# Patient Record
Sex: FEMALE | Race: BLACK OR AFRICAN AMERICAN | NOT HISPANIC OR LATINO | Employment: FULL TIME | URBAN - METROPOLITAN AREA
[De-identification: names, ages, dates, MRNs, and addresses within clinical notes are randomized per-mention and may not be internally consistent; named-entity substitution may affect disease eponyms.]

---

## 2019-02-12 ENCOUNTER — PATIENT OUTREACH (OUTPATIENT)
Dept: ADMINISTRATIVE | Facility: HOSPITAL | Age: 54
End: 2019-02-12

## 2019-02-26 ENCOUNTER — PATIENT MESSAGE (OUTPATIENT)
Dept: INTERNAL MEDICINE | Facility: CLINIC | Age: 54
End: 2019-02-26

## 2019-02-26 ENCOUNTER — OFFICE VISIT (OUTPATIENT)
Dept: INTERNAL MEDICINE | Facility: CLINIC | Age: 54
End: 2019-02-26
Payer: COMMERCIAL

## 2019-02-26 ENCOUNTER — LAB VISIT (OUTPATIENT)
Dept: LAB | Facility: HOSPITAL | Age: 54
End: 2019-02-26
Attending: INTERNAL MEDICINE
Payer: COMMERCIAL

## 2019-02-26 VITALS
WEIGHT: 175.5 LBS | OXYGEN SATURATION: 98 % | BODY MASS INDEX: 31.1 KG/M2 | TEMPERATURE: 97 F | DIASTOLIC BLOOD PRESSURE: 80 MMHG | HEIGHT: 63 IN | HEART RATE: 78 BPM | SYSTOLIC BLOOD PRESSURE: 120 MMHG

## 2019-02-26 DIAGNOSIS — Z12.11 COLON CANCER SCREENING: ICD-10-CM

## 2019-02-26 DIAGNOSIS — E66.9 OBESITY (BMI 30.0-34.9): ICD-10-CM

## 2019-02-26 DIAGNOSIS — Z00.00 ANNUAL PHYSICAL EXAM: ICD-10-CM

## 2019-02-26 DIAGNOSIS — Z23 IMMUNIZATION DUE: ICD-10-CM

## 2019-02-26 DIAGNOSIS — Z00.00 ANNUAL PHYSICAL EXAM: Primary | ICD-10-CM

## 2019-02-26 PROBLEM — E66.811 OBESITY (BMI 30.0-34.9): Status: ACTIVE | Noted: 2019-02-26

## 2019-02-26 LAB
ALBUMIN SERPL BCP-MCNC: 3.8 G/DL
ALP SERPL-CCNC: 80 U/L
ALT SERPL W/O P-5'-P-CCNC: 17 U/L
ANION GAP SERPL CALC-SCNC: 7 MMOL/L
AST SERPL-CCNC: 21 U/L
BASOPHILS # BLD AUTO: 0.09 K/UL
BASOPHILS NFR BLD: 1.2 %
BILIRUB SERPL-MCNC: 0.4 MG/DL
BUN SERPL-MCNC: 10 MG/DL
CALCIUM SERPL-MCNC: 9.9 MG/DL
CHLORIDE SERPL-SCNC: 105 MMOL/L
CHOLEST SERPL-MCNC: 284 MG/DL
CHOLEST/HDLC SERPL: 4.4 {RATIO}
CO2 SERPL-SCNC: 29 MMOL/L
CREAT SERPL-MCNC: 1 MG/DL
DIFFERENTIAL METHOD: NORMAL
EOSINOPHIL # BLD AUTO: 0.2 K/UL
EOSINOPHIL NFR BLD: 2.8 %
ERYTHROCYTE [DISTWIDTH] IN BLOOD BY AUTOMATED COUNT: 13 %
EST. GFR  (AFRICAN AMERICAN): >60 ML/MIN/1.73 M^2
EST. GFR  (NON AFRICAN AMERICAN): >60 ML/MIN/1.73 M^2
GLUCOSE SERPL-MCNC: 89 MG/DL
HCT VFR BLD AUTO: 40.5 %
HDLC SERPL-MCNC: 64 MG/DL
HDLC SERPL: 22.5 %
HGB BLD-MCNC: 13.1 G/DL
IMM GRANULOCYTES # BLD AUTO: 0.02 K/UL
IMM GRANULOCYTES NFR BLD AUTO: 0.3 %
LDLC SERPL CALC-MCNC: 189.8 MG/DL
LYMPHOCYTES # BLD AUTO: 2.8 K/UL
LYMPHOCYTES NFR BLD: 38.2 %
MCH RBC QN AUTO: 29.4 PG
MCHC RBC AUTO-ENTMCNC: 32.3 G/DL
MCV RBC AUTO: 91 FL
MONOCYTES # BLD AUTO: 0.8 K/UL
MONOCYTES NFR BLD: 10.1 %
NEUTROPHILS # BLD AUTO: 3.5 K/UL
NEUTROPHILS NFR BLD: 47.4 %
NONHDLC SERPL-MCNC: 220 MG/DL
NRBC BLD-RTO: 0 /100 WBC
PLATELET # BLD AUTO: 267 K/UL
PMV BLD AUTO: 10.4 FL
POTASSIUM SERPL-SCNC: 4.4 MMOL/L
PROT SERPL-MCNC: 7.3 G/DL
RBC # BLD AUTO: 4.46 M/UL
SODIUM SERPL-SCNC: 141 MMOL/L
TRIGL SERPL-MCNC: 151 MG/DL
TSH SERPL DL<=0.005 MIU/L-ACNC: 2.4 UIU/ML
WBC # BLD AUTO: 7.39 K/UL

## 2019-02-26 PROCEDURE — 90471 TDAP VACCINE GREATER THAN OR EQUAL TO 7YO IM: ICD-10-PCS | Mod: S$GLB,,, | Performed by: INTERNAL MEDICINE

## 2019-02-26 PROCEDURE — 99999 PR PBB SHADOW E&M-EST. PATIENT-LVL III: ICD-10-PCS | Mod: PBBFAC,,, | Performed by: INTERNAL MEDICINE

## 2019-02-26 PROCEDURE — 85025 COMPLETE CBC W/AUTO DIFF WBC: CPT

## 2019-02-26 PROCEDURE — 36415 COLL VENOUS BLD VENIPUNCTURE: CPT

## 2019-02-26 PROCEDURE — 90715 TDAP VACCINE GREATER THAN OR EQUAL TO 7YO IM: ICD-10-PCS | Mod: S$GLB,,, | Performed by: INTERNAL MEDICINE

## 2019-02-26 PROCEDURE — 90471 IMMUNIZATION ADMIN: CPT | Mod: S$GLB,,, | Performed by: INTERNAL MEDICINE

## 2019-02-26 PROCEDURE — 80053 COMPREHEN METABOLIC PANEL: CPT

## 2019-02-26 PROCEDURE — 90715 TDAP VACCINE 7 YRS/> IM: CPT | Mod: S$GLB,,, | Performed by: INTERNAL MEDICINE

## 2019-02-26 PROCEDURE — 99386 PREV VISIT NEW AGE 40-64: CPT | Mod: 25,S$GLB,, | Performed by: INTERNAL MEDICINE

## 2019-02-26 PROCEDURE — 99386 PR PREVENTIVE VISIT,NEW,40-64: ICD-10-PCS | Mod: 25,S$GLB,, | Performed by: INTERNAL MEDICINE

## 2019-02-26 PROCEDURE — 99999 PR PBB SHADOW E&M-EST. PATIENT-LVL III: CPT | Mod: PBBFAC,,, | Performed by: INTERNAL MEDICINE

## 2019-02-26 PROCEDURE — 80061 LIPID PANEL: CPT

## 2019-02-26 PROCEDURE — 84443 ASSAY THYROID STIM HORMONE: CPT

## 2019-02-26 RX ORDER — ESTERIFIED ESTROGEN AND METHYLTESTOSTERONE .625; 1.25 MG/1; MG/1
1 TABLET ORAL DAILY
COMMUNITY
End: 2019-02-26

## 2019-02-26 RX ORDER — SODIUM, POTASSIUM,MAG SULFATES 17.5-3.13G
SOLUTION, RECONSTITUTED, ORAL ORAL
Qty: 354 ML | Refills: 0 | Status: ON HOLD | OUTPATIENT
Start: 2019-02-26 | End: 2019-04-16 | Stop reason: HOSPADM

## 2019-02-26 RX ORDER — AMOXICILLIN 500 MG
CAPSULE ORAL DAILY
COMMUNITY

## 2019-02-26 NOTE — PROGRESS NOTES
Subjective:       Patient ID: Yessenia Glover is a 54 y.o. female.    Chief Complaint: Establish Care and Annual Exam    Yessenia Glover  54 y.o. Black or  female     Patient presents with:  Establish Care  Annual Exam    HPI: Presents to the clinic to establish care and for an annual exam. She is new to [facility]. She is without significant complaints.   She is fasting today for labs.   She reports being on both high blood pressure and high cholesterol medication in the past. She has not taken any medications for these issues in the past year.   She admits to poor dieting and lack of regular physical activity.     Lipid Panel due on 1967  Pap Smear with HPV Cotest due on 02/11/1988  Mammogram due on 02/11/2007  Colonoscopy due on 02/11/2017  TETANUS VACCINE due on 03/04/2018  Influenza Vaccine Completed    Past Medical History:  Hyperlipidemia  Hypertension    Past Surgical History:  TUBAL LIGATION    Review of patient's family history indicates:  Problem: Hypertension      Relation: Mother          Age of Onset: (Not Specified)  Problem: Hypertension      Relation: Father          Age of Onset: (Not Specified)      Current Outpatient Medications on File Prior to Visit:  fish oil-omega-3 fatty acids 300-1,000 mg capsule, Take by mouth once daily., Disp: , Rfl:   MULTIVIT-IRON-MIN-FOLIC ACID 3,500-18-0.4 UNIT-MG-MG ORAL CHEW, Take by mouth once daily., Disp: , Rfl:     Allergies:  Review of patient's allergies indicates:   -- Codeine -- Itching   -- Sulfa (sulfonamide antibiotics) -- Nausea And Vomiting                Review of Systems   Constitutional: Positive for fatigue. Negative for fever and unexpected weight change.   HENT: Positive for dental problem (loose bridge work on the left). Negative for trouble swallowing and voice change.    Eyes: Positive for visual disturbance (wears reading glasses).   Respiratory: Negative for cough and shortness of breath.    Cardiovascular: Negative for  chest pain, palpitations and leg swelling.   Gastrointestinal: Negative for abdominal pain, blood in stool, constipation, diarrhea and nausea.   Endocrine: Positive for heat intolerance.   Genitourinary: Positive for menstrual problem. Negative for dysuria and frequency.        Menopausal symptoms.    Musculoskeletal: Negative for arthralgias, back pain, gait problem and joint swelling.   Neurological: Negative for dizziness and headaches.   Psychiatric/Behavioral: Positive for sleep disturbance. Negative for dysphoric mood. The patient is not nervous/anxious.        Objective:      Physical Exam   Constitutional: She is oriented to person, place, and time. She appears well-developed and well-nourished. No distress.   Eyes: No scleral icterus.   Neck: No tracheal deviation present. No thyromegaly present.   Cardiovascular: Normal rate, regular rhythm and normal heart sounds.   Pulmonary/Chest: Effort normal and breath sounds normal. No respiratory distress. She has no wheezes. She has no rales.   Abdominal: Soft. Bowel sounds are normal.   Musculoskeletal: She exhibits no edema.   Lymphadenopathy:     She has no cervical adenopathy.   Neurological: She is alert and oriented to person, place, and time.   Skin: Skin is warm and dry.   Darkened skin on the right anterior neck.    Psychiatric: She has a normal mood and affect.   Vitals reviewed.      Assessment:       1. Annual physical exam    2. Obesity (BMI 30.0-34.9)    3. Colon cancer screening    4. Immunization due        Plan:       Yessenia was seen today for establish care and annual exam.    Diagnoses and all orders for this visit:    Annual physical exam  -     Counseled regarding age appropriate screenings and immunizations  -     Counseled regarding lifestyle modifications  -     Comprehensive metabolic panel; Future  -     TSH; Future  -     CBC auto differential; Future  -     Lipid panel; Future    Obesity (BMI 30.0-34.9)  -     Lifestyle modifications  discussed    Colon cancer screening  -     Case request GI: COLONOSCOPY  -     sodium,potassium,mag sulfates (SUPREP BOWEL PREP KIT) 17.5-3.13-1.6 gram SolR; Take as directed.    Immunization due  -     (In Office Administered) Tdap Vaccine    Labs today.     Obtain Pap smear and mammogram reports.     RTC annually and as needed.

## 2019-02-27 ENCOUNTER — PATIENT MESSAGE (OUTPATIENT)
Dept: INTERNAL MEDICINE | Facility: CLINIC | Age: 54
End: 2019-02-27

## 2019-02-28 ENCOUNTER — TELEPHONE (OUTPATIENT)
Dept: ENDOSCOPY | Facility: HOSPITAL | Age: 54
End: 2019-02-28

## 2019-02-28 NOTE — TELEPHONE ENCOUNTER
Endoscopy Scheduling Questionnaire:    Call Type:Incoming call    1. Have you been admitted overnight to the hospital in the past 3 months? no  2. Do you get CP and SOB while walking up a flight of stairs? no  3. Have you had a stent placed in the past 12 months? no  4. Have you had a stroke or heart attack in the past 6 months? no  5. Have you had any chest pain in the past 3 months? no      If so, have you been evaluated by your PCP or Cardiologist? no  6. Do you take weight loss medications? no  7. Have you been diagnosed with Diverticulitis within the past 3 months? no  8. Are you having any GI symptoms that you feel need to be evaluated prior to your procedure? no  9. Are you on dialysis? no  10. Are you diabetic? no  11. Do you have any other health issues that you feel might limit your ability to safely have the procedure and/or sedation? no  12. Is the patient over 79 yo? no        If so, has the patient been seen by their PCP or GI in the last 3 months? N/A       -I have reviewed the last colonoscopy for recommendations regarding surveillance and bowel prep  is NA  -I have reviewed the patient's medications and allergies. She is not on high risk medications and will require cardiac clearance. A clearance request NA  -I have verified the pharmacy information. The prep being used is Suprep. The patient's prep instructions were sent via mail..    Date Endoscopy Scheduled: Date: 4/30/19  Or  Date Gastro office visit Scheduled: NA

## 2019-03-01 ENCOUNTER — TELEPHONE (OUTPATIENT)
Dept: INTERNAL MEDICINE | Facility: CLINIC | Age: 54
End: 2019-03-01

## 2019-03-01 DIAGNOSIS — E78.5 HYPERLIPIDEMIA LDL GOAL <160: Primary | ICD-10-CM

## 2019-03-04 ENCOUNTER — TELEPHONE (OUTPATIENT)
Dept: ENDOSCOPY | Facility: HOSPITAL | Age: 54
End: 2019-03-04

## 2019-03-04 NOTE — TELEPHONE ENCOUNTER
Returning Voice message. Left patient a message to call our office back, call back number provided.

## 2019-03-12 ENCOUNTER — TELEPHONE (OUTPATIENT)
Dept: ENDOSCOPY | Facility: HOSPITAL | Age: 54
End: 2019-03-12

## 2019-04-04 ENCOUNTER — OFFICE VISIT (OUTPATIENT)
Dept: DERMATOLOGY | Facility: CLINIC | Age: 54
End: 2019-04-04
Payer: COMMERCIAL

## 2019-04-04 DIAGNOSIS — L91.8 ACROCHORDON: ICD-10-CM

## 2019-04-04 DIAGNOSIS — L30.4 INTERTRIGO: Primary | ICD-10-CM

## 2019-04-04 PROCEDURE — 99203 PR OFFICE/OUTPT VISIT, NEW, LEVL III, 30-44 MIN: ICD-10-PCS | Mod: 25,S$GLB,, | Performed by: DERMATOLOGY

## 2019-04-04 PROCEDURE — 11200 RMVL SKIN TAGS UP TO&INC 15: CPT | Mod: S$GLB,,, | Performed by: DERMATOLOGY

## 2019-04-04 PROCEDURE — 99999 PR PBB SHADOW E&M-EST. PATIENT-LVL II: CPT | Mod: PBBFAC,,, | Performed by: DERMATOLOGY

## 2019-04-04 PROCEDURE — 99203 OFFICE O/P NEW LOW 30 MIN: CPT | Mod: 25,S$GLB,, | Performed by: DERMATOLOGY

## 2019-04-04 PROCEDURE — 99999 PR PBB SHADOW E&M-EST. PATIENT-LVL II: ICD-10-PCS | Mod: PBBFAC,,, | Performed by: DERMATOLOGY

## 2019-04-04 PROCEDURE — 11200 PR REMOVAL OF SKIN TAGS, UP TO 15: ICD-10-PCS | Mod: S$GLB,,, | Performed by: DERMATOLOGY

## 2019-04-04 RX ORDER — ROSUVASTATIN CALCIUM 5 MG/1
TABLET, COATED ORAL
COMMUNITY
Start: 2019-01-10 | End: 2019-04-04

## 2019-04-04 RX ORDER — MICONAZOLE NITRATE 2 %
POWDER (GRAM) TOPICAL
Qty: 85 G | Refills: 11 | Status: SHIPPED | OUTPATIENT
Start: 2019-04-04 | End: 2020-09-10

## 2019-04-04 RX ORDER — KETOCONAZOLE 20 MG/G
CREAM TOPICAL
Qty: 60 G | Refills: 3 | Status: SHIPPED | OUTPATIENT
Start: 2019-04-04 | End: 2020-09-10

## 2019-04-04 RX ORDER — TRIAMCINOLONE ACETONIDE 0.25 MG/G
CREAM TOPICAL 2 TIMES DAILY PRN
Qty: 80 G | Refills: 1 | Status: SHIPPED | OUTPATIENT
Start: 2019-04-04 | End: 2020-09-10

## 2019-04-04 NOTE — PATIENT INSTRUCTIONS
Dove sensitive skin deodorant or [employer]m's all natural deodorant      Instructions for intertrigo:    · Use ketoconazole cream twice a day.  For flares only, mix ketoconazole cream with triamcinolone 0.025% cream (mild steroid cream) and use both twice a day when skin rash worsens and flares.    · Use miconazole powder or purchase over-the-counter Zeasorb-AF powder and use two to three times a day as needed to absorb sweat and prevent rash from worsening  · Frequently air out folds while at home (use a hand-held blow dryer set to cool)

## 2019-04-04 NOTE — PROGRESS NOTES
Subjective:       Patient ID:  Yessenia Glover is a 54 y.o. female who presents for   Chief Complaint   Patient presents with    Mole     moles beneath breast x 2 years      History of Present Illness: The patient presents with chief complaint of moles.  Location: beneath breast  Duration: 2 years   Signs/Symptoms: irritation     Prior treatments: none    Pt denies personal and family hx of skin cancer.         Review of Systems   Constitutional: Negative for fever and chills.   Gastrointestinal: Negative for nausea and vomiting.   Skin: Negative for daily sunscreen use, activity-related sunscreen use and recent sunburn.   Hematologic/Lymphatic: Does not bruise/bleed easily.        Objective:    Physical Exam   Constitutional: She appears well-developed and well-nourished. No distress.   Neurological: She is alert and oriented to person, place, and time. She is not disoriented.   Psychiatric: She has a normal mood and affect.   Skin:   Areas Examined (abnormalities noted in diagram):   Head / Face Inspection Performed  Neck Inspection Performed  Chest / Axilla Inspection Performed  Abdomen Inspection Performed  Back Inspection Performed  RUE Inspected  LUE Inspection Performed  Nails and Digits Inspection Performed              Diagram Legend     Erythematous scaling macule/papule c/w actinic keratosis       Vascular papule c/w angioma      Pigmented verrucoid papule/plaque c/w seborrheic keratosis      Yellow umbilicated papule c/w sebaceous hyperplasia      Irregularly shaped tan macule c/w lentigo     1-2 mm smooth white papules consistent with Milia      Movable subcutaneous cyst with punctum c/w epidermal inclusion cyst      Subcutaneous movable cyst c/w pilar cyst      Firm pink to brown papule c/w dermatofibroma      Pedunculated fleshy papule(s) c/w skin tag(s)      Evenly pigmented macule c/w junctional nevus     Mildly variegated pigmented, slightly irregular-bordered macule c/w mildly atypical nevus       Flesh colored to evenly pigmented papule c/w intradermal nevus       Pink pearly papule/plaque c/w basal cell carcinoma      Erythematous hyperkeratotic cursted plaque c/w SCC      Surgical scar with no sign of skin cancer recurrence      Open and closed comedones      Inflammatory papules and pustules      Verrucoid papule consistent consistent with wart     Erythematous eczematous patches and plaques     Dystrophic onycholytic nail with subungual debris c/w onychomycosis     Umbilicated papule    Erythematous-base heme-crusted tan verrucoid plaque consistent with inflamed seborrheic keratosis     Erythematous Silvery Scaling Plaque c/w Psoriasis     See annotation      Assessment / Plan:        Intertrigo  -     ketoconazole (NIZORAL) 2 % cream; AAA bid  Dispense: 60 g; Refill: 3  -     triamcinolone acetonide 0.025% (KENALOG) 0.025 % cream; Apply topically 2 (two) times daily as needed. Mild steroid.  Dispense: 80 g; Refill: 1  -     miconazole NITRATE 2 % (ZEASORB AF) 2 % top powder; Use two to three times daily to prevent rash  Dispense: 85 g; Refill: 11  -      Currently improved, will start above meds prn.    Acrochordon  Reassurance provided.  Informed patient that lesions are benign.  After risks, benefits and alternatives explained, including allergic reaction to anesthesia (if given), pain, recurrence, and scar, and verbal consent was obtained, 2 lesions treated with scissor excision.  Patient tolerated well.  Hemostasis achieved with aluminum chloride.  Verbal wound care instructions were given.              Follow up if symptoms worsen or fail to improve.

## 2019-04-05 ENCOUNTER — OFFICE VISIT (OUTPATIENT)
Dept: PODIATRY | Facility: CLINIC | Age: 54
End: 2019-04-05
Payer: COMMERCIAL

## 2019-04-05 VITALS
BODY MASS INDEX: 31.21 KG/M2 | WEIGHT: 176.13 LBS | HEIGHT: 63 IN | HEART RATE: 64 BPM | SYSTOLIC BLOOD PRESSURE: 126 MMHG | DIASTOLIC BLOOD PRESSURE: 83 MMHG

## 2019-04-05 DIAGNOSIS — B35.3 TINEA PEDIS OF BOTH FEET: ICD-10-CM

## 2019-04-05 DIAGNOSIS — B35.1 DERMATOPHYTOSIS OF NAIL: Primary | ICD-10-CM

## 2019-04-05 PROCEDURE — 87107 FUNGI IDENTIFICATION MOLD: CPT

## 2019-04-05 PROCEDURE — 99203 PR OFFICE/OUTPT VISIT, NEW, LEVL III, 30-44 MIN: ICD-10-PCS | Mod: S$GLB,,, | Performed by: PODIATRIST

## 2019-04-05 PROCEDURE — 87101 SKIN FUNGI CULTURE: CPT

## 2019-04-05 PROCEDURE — 99999 PR PBB SHADOW E&M-EST. PATIENT-LVL III: ICD-10-PCS | Mod: PBBFAC,,, | Performed by: PODIATRIST

## 2019-04-05 PROCEDURE — 99999 PR PBB SHADOW E&M-EST. PATIENT-LVL III: CPT | Mod: PBBFAC,,, | Performed by: PODIATRIST

## 2019-04-05 PROCEDURE — 99203 OFFICE O/P NEW LOW 30 MIN: CPT | Mod: S$GLB,,, | Performed by: PODIATRIST

## 2019-04-05 RX ORDER — CICLOPIROX 80 MG/ML
SOLUTION TOPICAL
Qty: 1 BOTTLE | Refills: 10 | Status: SHIPPED | OUTPATIENT
Start: 2019-04-05 | End: 2022-03-29

## 2019-04-05 NOTE — PATIENT INSTRUCTIONS
PENLAC ANTI-FUNGAL TOPICAL INSTRUCTIONS   1. You have been prescribed Penlac nail lacquer (Ciclopirox) topical solution 8%. Go and  the prescription from your local pharmacy.    2. Remove any nail polish on your feet. File away (with emery board) loose nail material and trim nails.    3. Apply the Penlac nail lacquer (ciclopirox) Topical Solution, 8% once daily (preferably at bedtime or eight hours before washing) to all affected nails with the applicator brush provided. The nail lacquer should be applied evenly over the entire nail plate. If possible, apply the solution to the nail bed, hyponychium, and the under surface of the nail plate when if your nail is loosely attached. Remember fungus lives under the nail plate, therefore the more the solution penetrates the nail bed, the better.    3. Continue to apply the solution daily (at bedtime preferably).  Daily applications should be made over the previous coat and removed with alcohol every seven days. This cycle should be repeated throughout the duration of therapy.     4. On the 7th day, remove the solution from all of your nails with alcohol. File your nails again with an emery board and then repeat the cycle again.    5. This treatment must be consistent. If you skip a day, continue the cycle as recommended. It may take up to 1 year for your nails to clear the fungal infection. Be patient.    It is recommended to dispose of all infected shoe gear that you will not likely wear again as well as weekly cleansing of all showering/bathing areas with antiseptics.Fungal spores like to hide in dark, warm, moist environments. Lastly, monthly treatments of all current shoe gear with moth balls x 8 hours.                  FUNGAL NAIL INFECTIONS    If your nail is thick and looks white or yellow, it may be infected with fungus. Nail   infections don't look pleasant, but they are not serious. There are treatments that   can clear up the infection.     What is a  fungal nail infection?   It's easy to catch a fungal nail infection, and lots of people get them. The sooner you   treat an infection, the easier it is to get rid of it. The fungi that cause these infections often live in warm, damp places, such as showers and floors around changing rooms.   People used to think there was nothing you could do about a fungal nail infection. But   there are now good treatments that can get rid of it. However, they take a long time to   work (as long as one year if the infection is bad). So you need to be patient.    Fungal infection of the nails causes changes in the appearance of fingernails and toenails. They may thicken, discolor, change shape or split. This condition is difficult to treat because nails grow slowly and have limited blood supply. It is common to have the infection come back after treatment.       What are the symptoms?   Your nail may look whitish or yellowish, and raised up from the finger or toe. The skin   around your nail may turn red. Sometimes the nail lifts off altogether. If the infection is   severe, the nail may also be crumbly. It's more common to get an infection of a toenail   than a fingernail. If you've had an infection for a long time, it may be painful. If you have a badly infected toe, it may be difficult to walk. If your immune system is weak or you have diabetes, you may be more likely to get these infections. The infection can also be more serious. So it's important to see your doctor as soon as possible if you think you have a nail infection.     What treatments work?   To get rid of a fungal nail infection you will probably have to take tablets, sometimes for   several months. There are also topical solutions (over the counter and prescription) that  you can put on your nail, and  things you can do to try to avoid getting another nail infection.    There are two types of medicines used.   Topical anti-fungal medicines are applied to the  "surface of the skin and nail area. These medicines are not very effective because they cannot get deep into the nail.     Topical medicines are most useful in combination with oral medicines . Oral antifungal medicines are more effective because they penetrate the nail from the inside out.  If medicines fail, the nail can be removed surgically or chemically. This improves the effectiveness of medical treatment because the fungus is physically removed from the body.       Tablets   The tablets that doctors use most often are called itraconazole (brand name Sporanox)   and terbinafine (Lamisil). Terbinafine seems to work slightly better. If you take terbinafine every day for 12 weeks, there's a 6 in 10 chance you'll get rid of   your fungal toenail infection.      How are fungal nail infections treated? -- Treatment depends, in part, on how severe the infection is, and how much it bothers you. If your infection is mild or doesn't bother you very much, you might choose not to treat it. An untreated nail infection probably won't go away, but it probably won't cause any long-term problems either.  When people need or choose to have treatment, it usually involves "antifungal" medicines that you get with a prescription from your doctor. These medicines are taken by mouth or put on the nail.Treatment with pills usually lasts a few months. Some people who take these medicines need to have blood tests. That's because these medicines can affect the liver.  If you don't want to or can't take antifungal pills, your doctor will talk with you about other treatment options. These might include using an antifungal medicine on the nail or having surgery to remove your nail.    Before starting any of these treatments, you should know that:  ?It can take many months for your nail to look normal again.  ?There is a chance that the treatment won't work. The infection might not get better, or it might come back. If either of these things " happen, your doctor can try another treatment or send you to a specialist.  Can fungal nail infections be prevented? -- Sometimes. To reduce your chance of getting one, you can:  ?Keep your feet clean and dry.  ?Avoid sharing nail tools, such as clippers and scissors.  ?Wear flip-flops or other footwear in a gym shower or locker room.  What if I want to get pregnant? -- If you want to get pregnant, let your doctor or nurse know. He or she might recommend that you not take certain antifungal medicines during pregnancy.    Alternative final options are:   If still no resolution with oral tablets or topics: then we can completely avulse/remove all involved toenails with subsequent treatment with topical antifungal solution.       Home Care:   1) Use medicines exactly as directed for as long as directed. Treating a fungal infection can take longer than other kinds of infections.   2) Smoking is a risk factor for fungal infection. This is one more reason to quit.   3) Wear absorbent socks and shoes that have ventilation. Sweaty feet increases risk of fungal infection and make an existing infection harder to treat.   4) Use footwear when in damp public places like swimming pools, gyms and shower rooms. This helps avoid exposure to the fungus that grows there.   It is recommended to dispose of all infected shoe gear that you will not likely wear again as well as weekly cleansing of all showering/bathing areas with antiseptics.Fungal spores like to hide in dark, warm, moist environments. Lastly, monthly treatments of all current shoe gear with moth balls x 8 hours.       Follow Up   with your doctor as directed by our staff.   Get Prompt Medical Attention   if any of the following occur:   -- Skin alongside the nail becomes reddened, swollen, painful or drains pus   -- Side effects from oral anti-fungal medicines       © 2283-2333 [employer]. [address] [place] [address], [place] [address]. All rights reserved.  This information is not intended as a substitute for professional medical care. Always follow your healthcare professional's instructions.             You can purchase UREA 40% cream online     www.[employer]     [employer]   Urea 40% Foot Cream 4 oz - Best Callus Remover - Moisturizes & Rehydrates Thick, Cracked, Rough, Dead & Dry Skin - For Feet, Elbows and Hands + Free Pumice Stone - 100% Money Back Guarantee   4.6 out of 5 stars 1,181   $14.99 Prime    You can also purchase Flexitol heel balm cream. This can be found at [employer] or online www.[employer]

## 2019-04-16 ENCOUNTER — HOSPITAL ENCOUNTER (OUTPATIENT)
Facility: HOSPITAL | Age: 54
Discharge: HOME | End: 2019-04-16
Attending: FAMILY MEDICINE | Admitting: FAMILY MEDICINE
Payer: COMMERCIAL

## 2019-04-16 ENCOUNTER — ANESTHESIA EVENT (OUTPATIENT)
Dept: ENDOSCOPY | Facility: HOSPITAL | Age: 54
End: 2019-04-16
Payer: COMMERCIAL

## 2019-04-16 ENCOUNTER — ANESTHESIA (OUTPATIENT)
Dept: ENDOSCOPY | Facility: HOSPITAL | Age: 54
End: 2019-04-16
Payer: COMMERCIAL

## 2019-04-16 DIAGNOSIS — Z12.11 COLON CANCER SCREENING: Primary | ICD-10-CM

## 2019-04-16 DIAGNOSIS — Z12.11 SPECIAL SCREENING FOR MALIGNANT NEOPLASMS, COLON: ICD-10-CM

## 2019-04-16 LAB
B-HCG UR QL: NEGATIVE
CTP QC/QA: YES

## 2019-04-16 PROCEDURE — 81025 URINE PREGNANCY TEST: CPT | Performed by: FAMILY MEDICINE

## 2019-04-16 PROCEDURE — G0121 COLON CA SCRN NOT HI RSK IND: HCPCS | Mod: ,,, | Performed by: FAMILY MEDICINE

## 2019-04-16 PROCEDURE — G0121 COLON CA SCRN NOT HI RSK IND: HCPCS | Performed by: FAMILY MEDICINE

## 2019-04-16 PROCEDURE — 37000008 HC ANESTHESIA 1ST 15 MINUTES: Performed by: FAMILY MEDICINE

## 2019-04-16 PROCEDURE — 25000003 PHARM REV CODE 250: Performed by: NURSE ANESTHETIST, CERTIFIED REGISTERED

## 2019-04-16 PROCEDURE — G0121 COLON CA SCRN NOT HI RSK IND: ICD-10-PCS | Mod: ,,, | Performed by: FAMILY MEDICINE

## 2019-04-16 PROCEDURE — 25000003 PHARM REV CODE 250: Performed by: FAMILY MEDICINE

## 2019-04-16 PROCEDURE — 37000009 HC ANESTHESIA EA ADD 15 MINS: Performed by: FAMILY MEDICINE

## 2019-04-16 PROCEDURE — 63600175 PHARM REV CODE 636 W HCPCS: Performed by: NURSE ANESTHETIST, CERTIFIED REGISTERED

## 2019-04-16 RX ORDER — SODIUM CHLORIDE 0.9 % (FLUSH) 0.9 %
10 SYRINGE (ML) INJECTION
Status: CANCELLED | OUTPATIENT
Start: 2019-04-16

## 2019-04-16 RX ORDER — LIDOCAINE HYDROCHLORIDE 10 MG/ML
INJECTION INFILTRATION; PERINEURAL
Status: DISCONTINUED | OUTPATIENT
Start: 2019-04-16 | End: 2019-04-16

## 2019-04-16 RX ORDER — PROPOFOL 10 MG/ML
VIAL (ML) INTRAVENOUS
Status: DISCONTINUED | OUTPATIENT
Start: 2019-04-16 | End: 2019-04-16

## 2019-04-16 RX ORDER — SODIUM CHLORIDE, SODIUM LACTATE, POTASSIUM CHLORIDE, CALCIUM CHLORIDE 600; 310; 30; 20 MG/100ML; MG/100ML; MG/100ML; MG/100ML
INJECTION, SOLUTION INTRAVENOUS CONTINUOUS
Status: DISCONTINUED | OUTPATIENT
Start: 2019-04-16 | End: 2019-04-16 | Stop reason: HOSPADM

## 2019-04-16 RX ADMIN — PROPOFOL 100 MG: 10 INJECTION, EMULSION INTRAVENOUS at 09:04

## 2019-04-16 RX ADMIN — PROPOFOL 50 MG: 10 INJECTION, EMULSION INTRAVENOUS at 09:04

## 2019-04-16 RX ADMIN — SODIUM CHLORIDE, SODIUM LACTATE, POTASSIUM CHLORIDE, AND CALCIUM CHLORIDE: .6; .31; .03; .02 INJECTION, SOLUTION INTRAVENOUS at 08:04

## 2019-04-16 RX ADMIN — LIDOCAINE HYDROCHLORIDE 50 MG: 10 INJECTION, SOLUTION INFILTRATION; PERINEURAL at 09:04

## 2019-04-16 NOTE — PLAN OF CARE
Pt. Adequately sedated. V/S stable. Timeout performed and everyone agreed. See anesthesia notes.

## 2019-04-16 NOTE — DISCHARGE SUMMARY
Endoscopy Discharge Summary      Admit Date: 4/16/2019    Discharge Date and Time:  4/16/2019 9:42 AM    Attending Physician: Grant Cristobal MD     Discharge Physician: Grant Cristobal MD     Principal Admitting Diagnoses: Colon cancer screening         Discharge Diagnosis: The primary encounter diagnosis was Colon cancer screening. A diagnosis of Special screening for malignant neoplasms, colon was also pertinent to this visit.     Discharged Condition: Good    Indication for Admission: Colon cancer screening     Hospital Course: Patient was admitted for an inpatient procedure and tolerated the procedure well with no complications.    Significant Diagnostic Studies: Colonoscopy    Pathology (if any):  Specimen (12h ago, onward)    None          Estimated Blood Loss: 0 ml.    Discussed with: patient and family.    Disposition: Home.    Follow Up/Patient Instructions:   Current Discharge Medication List      CONTINUE these medications which have NOT CHANGED    Details   Ca-D3-mag-zinc--leti-boron (CALTRATE 600-D PLUS MINERALS) 600 mg calcium- 800 unit-40 mg Chew       ciclopirox (PENLAC) 8 % Soln Apply to affected toenails at night time DAILY. On 7th day, file nails down, clean all nails with alcohol and restart application process.  Qty: 1 Bottle, Refills: 10      fish oil-omega-3 fatty acids 300-1,000 mg capsule Take by mouth once daily.      ketoconazole (NIZORAL) 2 % cream AAA bid  Qty: 60 g, Refills: 3    Associated Diagnoses: Intertrigo      miconazole NITRATE 2 % (ZEASORB AF) 2 % top powder Use two to three times daily to prevent rash  Qty: 85 g, Refills: 11    Associated Diagnoses: Intertrigo      MULTIVIT-IRON-MIN-FOLIC ACID 3,500-18-0.4 UNIT-MG-MG ORAL CHEW Take by mouth once daily.      triamcinolone acetonide 0.025% (KENALOG) 0.025 % cream Apply topically 2 (two) times daily as needed. Mild steroid.  Qty: 80 g, Refills: 1    Associated Diagnoses: Intertrigo         STOP taking these medications        sodium,potassium,mag sulfates (SUPREP BOWEL PREP KIT) 17.5-3.13-1.6 gram SolR Comments:   Reason for Stopping:               Discharge Procedure Orders   Diet general     Call MD for:  temperature >100.4     Call MD for:  persistent nausea and vomiting     Call MD for:  severe uncontrolled pain     Call MD for:  difficulty breathing, headache or visual disturbances     Call MD for:  redness, tenderness, or signs of infection (pain, swelling, redness, odor or green/yellow discharge around incision site)     Call MD for:  hives     Call MD for:  persistent dizziness or light-headedness     No dressing needed       Follow-up Information     Go to Kellee Suero DO.    Specialty:  Internal Medicine  Why:  As needed  Contact information:  [address] [place] [address] DR Vic HER [id number] [phone]

## 2019-04-16 NOTE — ANESTHESIA PREPROCEDURE EVALUATION
04/16/2019  Yessenia Glover is a 54 y.o., female.    Anesthesia Evaluation    I have reviewed the Patient Summary Reports.     I have reviewed the Medications.     Review of Systems  Anesthesia Hx:  No problems with previous Anesthesia    Cardiovascular:   Hypertension hyperlipidemia        Physical Exam  General:  Obesity    Airway/Jaw/Neck:  Airway Findings: Mouth Opening: Normal Tongue: Normal  General Airway Assessment: Adult  Mallampati: I  TM Distance: Normal, at least 6 cm      Dental:  Dental Findings: In tact   Chest/Lungs:  Chest/Lungs Findings: Normal Respiratory Rate     Heart/Vascular:  Heart Findings: Rate: Normal  Rhythm: Regular Rhythm             Anesthesia Plan  Type of Anesthesia, risks & benefits discussed:  Anesthesia Type:  MAC  Patient's Preference:   Intra-op Monitoring Plan: standard ASA monitors  Intra-op Monitoring Plan Comments:   Post Op Pain Control Plan:   Post Op Pain Control Plan Comments:   Induction:   IV  Beta Blocker:  Patient is not currently on a Beta-Blocker (No further documentation required).       Informed Consent: Patient understands risks and agrees with Anesthesia plan.  Questions answered. Anesthesia consent signed with patient.  ASA Score: 2     Day of Surgery Review of History & Physical:  There are no significant changes.          Ready For Surgery From Anesthesia Perspective.

## 2019-04-16 NOTE — ANESTHESIA POSTPROCEDURE EVALUATION
Anesthesia Post Evaluation    Patient: Yessenia Glover    Procedure(s) Performed: Procedure(s) (LRB):  COLONOSCOPY (N/A)    Final Anesthesia Type: MAC  Patient location during evaluation: GI PACU  Patient participation: Yes- Able to Participate  Level of consciousness: awake and alert  Post-procedure vital signs: reviewed and stable  Pain management: adequate  Airway patency: patent  PONV status at discharge: No PONV  Anesthetic complications: no      Cardiovascular status: blood pressure returned to baseline  Respiratory status: unassisted and spontaneous ventilation  Hydration status: euvolemic  Follow-up not needed.          Vitals Value Taken Time   /78 4/16/2019 10:00 AM   Temp 36.3 °C (97.3 °F) 4/16/2019  8:29 AM   Pulse 64 4/16/2019 10:00 AM   Resp 18 4/16/2019 10:00 AM   SpO2 100 % 4/16/2019 10:00 AM         Event Time     Out of Recovery 10:07:49          Pain/Ele Score: Ele Score: 10 (4/16/2019  9:40 AM)

## 2019-04-16 NOTE — H&P
Short Stay Endoscopy History and Physical    PCP - Kellee Suero, DO    Procedure - Colonoscopy  ASA - 2  Mallampati - per anesthesia  History of Anesthesia problems - no  Family history Anesthesia problems -  no     HPI:  This is a 54 y.o. female here for evaluation of :   Active Hospital Problems    Diagnosis  POA    *Colon cancer screening [Z12.11]  Not Applicable    Special screening for malignant neoplasms, colon [Z12.11]  Not Applicable      Resolved Hospital Problems   No resolved problems to display.         Health Maintenance       Date Due Completion Date    Colonoscopy 02/11/2017 ---    Mammogram 10/26/2020 10/26/2018    Pap Smear with HPV Cotest 10/31/2021 10/31/2018    Lipid Panel 02/26/2024 2/26/2019    TETANUS VACCINE 02/26/2029 2/26/2019          Screening - yes  History of polyps - no  Diarrhea - no  Anemia - no  Blood in stools - no  Abdominal pain - no  Other - no    ROS:  CONSTITUTIONAL: Denies weight change,  fatigue, fevers, chills, night sweats.  CARDIOVASCULAR: Denies chest pain, shortness of breath, orthopnea and edema.  RESPIRATORY: Denies cough, hemoptysis, dyspnea, and wheezing.  GI: See HPI.    Medical History:   Past Medical History:   Diagnosis Date    Hyperlipidemia     Hypertension        Surgical History:   Past Surgical History:   Procedure Laterality Date    AXILLARY SURGERY      Right    THYROIDECTOMY, PARTIAL      TUBAL LIGATION         Family History:   Family History   Problem Relation Age of Onset    Hypertension Mother     Hypertension Father        Social History:   Social History     Tobacco Use    Smoking status: Never Smoker    Smokeless tobacco: Never Used   Substance Use Topics    Alcohol use: No     Frequency: Never    Drug use: No       Allergies:   Review of patient's allergies indicates:   Allergen Reactions    Codeine Itching    Sulfa (sulfonamide antibiotics) Nausea And Vomiting       Medications:   No current facility-administered medications on  file prior to encounter.      Current Outpatient Medications on File Prior to Encounter   Medication Sig Dispense Refill    fish oil-omega-3 fatty acids 300-1,000 mg capsule Take by mouth once daily.      MULTIVIT-IRON-MIN-FOLIC ACID 3,500-18-0.4 UNIT-MG-MG ORAL CHEW Take by mouth once daily.      sodium,potassium,mag sulfates (SUPREP BOWEL PREP KIT) 17.5-3.13-1.6 gram SolR Take as directed. 354 mL 0       Physical Exam:  Vital Signs:   Vitals:    04/16/19 0829   BP: 111/73   Pulse: 70   Resp: 16   Temp: 97.3 °F (36.3 °C)     General Appearance: Well appearing in no acute distress  ENT: OP clear  Chest: CTA B  CV: RRR, no m/r/g  Abd: s/nt/nd/nabs  Ext: no edema    Labs:Reviewed    IMP:  Active Hospital Problems    Diagnosis  POA    *Colon cancer screening [Z12.11]  Not Applicable    Special screening for malignant neoplasms, colon [Z12.11]  Not Applicable      Resolved Hospital Problems   No resolved problems to display.         Plan:   I have explained the risks and benefits of colonoscopy to the patient including but not limited to bleeding, perforation, infection, and death. The patient wishes to proceed.

## 2019-04-16 NOTE — ANESTHESIA RELEASE NOTE
"Anesthesia Release from PACU Note    Patient: Yessenia Glover    Procedure(s) Performed: Procedure(s) (LRB):  COLONOSCOPY (N/A)    Anesthesia type: MAC    Post pain: Adequate analgesia    Post assessment: no apparent anesthetic complications, tolerated procedure well and no evidence of recall    Last Vitals:   Visit Vitals  /78   Pulse 64   Temp 36.3 °C (97.3 °F) (Tympanic)   Resp 18   Ht 5' 3" (1.6 m)   Wt 78.6 kg (173 lb 4.5 oz)   SpO2 100%   Breastfeeding? No   BMI 30.70 kg/m²       Post vital signs: stable    Level of consciousness: awake, alert  and oriented    Nausea/Vomiting: no nausea/no vomiting    Complications: none    Airway Patency: patent    Respiratory: unassisted, spontaneous ventilation, room air    Cardiovascular: stable and blood pressure at baseline    Hydration: euvolemic  "

## 2019-04-16 NOTE — TRANSFER OF CARE
"Anesthesia Transfer of Care Note    Patient: Yessenia Glover    Procedure(s) Performed: Procedure(s) (LRB):  COLONOSCOPY (N/A)    Patient location: GI    Anesthesia Type: MAC    Transport from OR: Transported from OR on room air with adequate spontaneous ventilation    Post pain: adequate analgesia    Post assessment: no apparent anesthetic complications    Post vital signs: stable    Level of consciousness: awake, alert and oriented    Nausea/Vomiting: no nausea/vomiting    Complications: none    Transfer of care protocol was followed      Last vitals:   Visit Vitals  /73 (BP Location: Left arm, Patient Position: Lying)   Pulse 70   Temp 36.3 °C (97.3 °F) (Tympanic)   Resp 16   Ht 5' 3" (1.6 m)   Wt 78.6 kg (173 lb 4.5 oz)   SpO2 100%   Breastfeeding? No   BMI 30.70 kg/m²     "

## 2019-04-16 NOTE — [employer] PATIENT INSTRUCTIONS
Discharge Summary/Instructions after an Endoscopic Procedure  Patient Name: Yessenia Glover  Patient MRN: [id number]  Patient YOB: 1965 Tuesday, April 16, 2019 Grant Cristobal MD  RESTRICTIONS:  During your procedure today, you received medications for sedation.  These   medications may affect your judgment, balance and coordination.  Therefore,   for 24 hours, you have the following restrictions:   - DO NOT drive a car, operate machinery, make legal/financial decisions,   sign important papers or drink alcohol.    ACTIVITY:  Today: no heavy lifting, straining or running due to procedural   sedation/anesthesia.  The following day: return to full activity including work.  DIET:  Eat and drink normally unless instructed otherwise.     TREATMENT FOR COMMON SIDE EFFECTS:  - Mild abdominal pain, nausea, belching, bloating or excessive gas:  rest,   eat lightly and use a heating pad.  - Sore Throat: treat with throat lozenges and/or gargle with warm salt   water.  - Because air was used during the procedure, expelling large amounts of air   from your rectum or belching is normal.  - If a bowel prep was taken, you may not have a bowel movement for 1-3 days.    This is normal.  SYMPTOMS TO WATCH FOR AND REPORT TO YOUR PHYSICIAN:  1. Abdominal pain or bloating, other than gas cramps.  2. Chest pain.  3. Back pain.  4. Signs of infection such as: chills or fever occurring within 24 hours   after the procedure.  5. Rectal bleeding, which would show as bright red, maroon, or black stools.   (A tablespoon of blood from the rectum is not serious, especially if   hemorrhoids are present.)  6. Vomiting.  7. Weakness or dizziness.  GO DIRECTLY TO THE NEAREST EMERGENCY ROOM IF YOU HAVE ANY OF THE FOLLOWING:      Difficulty breathing              Chills and/or fever over 101 F   Persistent vomiting and/or vomiting blood   Severe abdominal pain   Severe chest pain   Black, tarry stools   Bleeding- more than one  tablespoon   Any other symptom or condition that you feel may need urgent attention  Your doctor recommends these additional instructions:  If any biopsies were taken, your doctors clinic will contact you in 1 to 2   weeks with any results.  - Patient has a contact number available for emergencies.  The signs and   symptoms of potential delayed complications were discussed with the   patient.  Return to normal activities tomorrow.  Written discharge   instructions were provided to the patient.   - Resume previous diet.   - Continue present medications.   - Repeat colonoscopy in 10 years for screening purposes.   - Discharge patient to home (via wheelchair).  For questions, problems or results please call your physician Grant Cristobal MD at Work:  [phone]  If you have any questions about the above instructions, call the GI   department at [phone] or call the endoscopy unit at [phone]   from 7am until 3 pm.  [facility] [place], EMERGENCY ROOM PHONE NUMBER:   [phone]  IF A COMPLICATION OR EMERGENCY SITUATION ARISES AND YOU ARE UNABLE TO REACH   YOUR PHYSICIAN - GO DIRECTLY TO THE EMERGENCY ROOM.  I have read or have had read to me these discharge instructions for my   procedure and have received a written copy.  I understand these   instructions and will follow-up with my physician if I have any questions.     __________________________________       _____________________________________  Nurse Signature                                          Patient/Designated   Responsible Party Signature  Grant Cristobal MD  4/16/2019 9:41:00 AM  This report has been verified and signed electronically.  [employer]

## 2019-04-16 NOTE — DISCHARGE INSTRUCTIONS
Colonoscopy     A camera attached to a flexible tube with a viewing lens is used to take video pictures.     Colonoscopy is a test to view the inside of your lower digestive tract (colon and rectum). Sometimes it can show the last part of the small intestine (ileum). During the test, small pieces of tissue may be removed for testing. This is called a biopsy. Small growths, such as polyps, may also be removed.   Why is colonoscopy done?  The test is done to help look for colon cancer. And it can help find the source of abdominal pain, bleeding, and changes in bowel habits. It may be needed once a year, depending on factors such as your:  · Age  · Health history  · Family health history  · Symptoms  · Results from any prior colonoscopy  Risks and possible complications  These include:  · Bleeding               · A puncture or tear in the colon   · Risks of anesthesia  · A cancer lesion not being seen  Getting ready   To prepare for the test:  · Talk with your healthcare provider about the risks of the test (see below). Also ask your healthcare provider about alternatives to the test.  · Tell your healthcare provider about any medicines you take. Also tell him or her about any health conditions you may have.  · Make sure your rectum and colon are empty for the test. Follow the diet and bowel prep instructions exactly. If you dont, the test may need to be rescheduled.  · Plan for a friend or family member to drive you home after the test.     Colonoscopy provides an inside view of the entire colon.     You may discuss the results with your doctor right away or at a future visit.  During the test   The test is usually done in the hospital on an outpatient basis. This means you go home the same day. The procedure takes about 30 minutes. During that time:  · You are given relaxing (sedating) medicine through an IV line. You may be drowsy, or fully asleep.  · The healthcare provider will first give you a physical exam to  check for anal and rectal problems.  · Then the anus is lubricated and the scope inserted.  · If you are awake, you may have a feeling similar to needing to have a bowel movement. You may also feel pressure as air is pumped into the colon. Its OK to pass gas during the procedure.  · Biopsy, polyp removal, or other treatments may be done during the test.  After the test   You may have gas right after the test. It can help to try to pass it to help prevent later bloating. Your healthcare provider may discuss the results with you right away. Or you may need to schedule a follow-up visit to talk about the results. After the test, you can go back to your normal eating and other activities. You may be tired from the sedation and need to rest for a few hours.  Date Last Reviewed: 11/1/2016 © 2000-2017 [employer]. [address] [place] [address], [place] [address]. All rights reserved. This information is not intended as a substitute for professional medical care. Always follow your healthcare professional's instructions.

## 2019-04-17 VITALS
DIASTOLIC BLOOD PRESSURE: 78 MMHG | SYSTOLIC BLOOD PRESSURE: 128 MMHG | OXYGEN SATURATION: 100 % | RESPIRATION RATE: 18 BRPM | TEMPERATURE: 97 F | BODY MASS INDEX: 30.71 KG/M2 | HEIGHT: 63 IN | WEIGHT: 173.31 LBS | HEART RATE: 64 BPM

## 2019-04-22 ENCOUNTER — PATIENT MESSAGE (OUTPATIENT)
Dept: PODIATRY | Facility: CLINIC | Age: 54
End: 2019-04-22

## 2019-04-26 ENCOUNTER — TELEPHONE (OUTPATIENT)
Dept: PODIATRY | Facility: CLINIC | Age: 54
End: 2019-04-26

## 2019-04-26 DIAGNOSIS — B35.9 DERMATOPHYTOSIS: Primary | ICD-10-CM

## 2019-04-26 RX ORDER — TERBINAFINE HYDROCHLORIDE 250 MG/1
250 TABLET ORAL DAILY
Qty: 90 TABLET | Refills: 0 | Status: SHIPPED | OUTPATIENT
Start: 2019-04-26 | End: 2019-11-05 | Stop reason: ALTCHOICE

## 2019-04-26 NOTE — TELEPHONE ENCOUNTER
Spoke with pt and informed her that the nail culture was positive for fungus and that medication was sent in to her pharmacy. I scheduled her on 6/7/19 for an LFT and informed her of the date and time. Pt understood and call ended pleasantly.

## 2019-05-14 LAB — FUNGUS BLD CULT: NORMAL

## 2019-06-07 ENCOUNTER — LAB VISIT (OUTPATIENT)
Dept: LAB | Facility: HOSPITAL | Age: 54
End: 2019-06-07
Attending: PODIATRIST
Payer: COMMERCIAL

## 2019-06-07 DIAGNOSIS — B35.1 DERMATOPHYTOSIS OF NAIL: ICD-10-CM

## 2019-06-07 LAB
ALBUMIN SERPL BCP-MCNC: 3.9 G/DL (ref 3.5–5.2)
ALP SERPL-CCNC: 82 U/L (ref 55–135)
ALT SERPL W/O P-5'-P-CCNC: 19 U/L (ref 10–44)
AST SERPL-CCNC: 24 U/L (ref 10–40)
BILIRUB DIRECT SERPL-MCNC: 0.2 MG/DL (ref 0.1–0.3)
BILIRUB SERPL-MCNC: 0.4 MG/DL (ref 0.1–1)
PROT SERPL-MCNC: 7.2 G/DL (ref 6–8.4)

## 2019-06-07 PROCEDURE — 80076 HEPATIC FUNCTION PANEL: CPT

## 2019-06-07 PROCEDURE — 36415 COLL VENOUS BLD VENIPUNCTURE: CPT

## 2019-07-16 ENCOUNTER — OFFICE VISIT (OUTPATIENT)
Dept: OBSTETRICS AND GYNECOLOGY | Facility: CLINIC | Age: 54
End: 2019-07-16
Payer: COMMERCIAL

## 2019-07-16 VITALS
SYSTOLIC BLOOD PRESSURE: 118 MMHG | HEIGHT: 63 IN | BODY MASS INDEX: 31.25 KG/M2 | DIASTOLIC BLOOD PRESSURE: 68 MMHG | WEIGHT: 176.38 LBS

## 2019-07-16 DIAGNOSIS — R68.82 DECREASED LIBIDO: ICD-10-CM

## 2019-07-16 DIAGNOSIS — N95.1 SYMPTOMATIC MENOPAUSAL OR FEMALE CLIMACTERIC STATES: Primary | ICD-10-CM

## 2019-07-16 PROCEDURE — 99203 PR OFFICE/OUTPT VISIT, NEW, LEVL III, 30-44 MIN: ICD-10-PCS | Mod: S$GLB,,, | Performed by: OBSTETRICS & GYNECOLOGY

## 2019-07-16 PROCEDURE — 99999 PR PBB SHADOW E&M-EST. PATIENT-LVL III: CPT | Mod: PBBFAC,,, | Performed by: OBSTETRICS & GYNECOLOGY

## 2019-07-16 PROCEDURE — 99203 OFFICE O/P NEW LOW 30 MIN: CPT | Mod: S$GLB,,, | Performed by: OBSTETRICS & GYNECOLOGY

## 2019-07-16 PROCEDURE — 99999 PR PBB SHADOW E&M-EST. PATIENT-LVL III: ICD-10-PCS | Mod: PBBFAC,,, | Performed by: OBSTETRICS & GYNECOLOGY

## 2019-07-16 RX ORDER — CLONIDINE HYDROCHLORIDE 0.1 MG/1
0.1 TABLET ORAL NIGHTLY
Qty: 30 TABLET | Refills: 6 | Status: SHIPPED | OUTPATIENT
Start: 2019-07-16 | End: 2020-11-10 | Stop reason: SDUPTHER

## 2019-07-16 NOTE — PROGRESS NOTES
"Subjective:       Patient ID: Yessenia Glover is a 54 y.o. female.    Chief Complaint:  Hot Flashes      History of Present Illness  HPI  here for problem   C/o intermittent worsening hot flushes--feels she has been suffering the last few years, but feels symptoms are getting worse  lmp 10/2018  Also problems sleeping; decreased libido; weight gain, increase appetite    Has previously tried estroven--but reports still having hot flushes    Used clonidine and was able to sleep and reports decrease in hot flushes--but stopped medication--as did not want to be "addicted" to medication        GYN & OB History  Patient's last menstrual period was 10/01/2018 (approximate).   Date of Last Pap: No result found    OB History    Para Term  AB Living   2 2 2     2   SAB TAB Ectopic Multiple Live Births           2      # Outcome Date GA Lbr Kevin/2nd Weight Sex Delivery Anes PTL Lv   2 Term      CS-LTranv   ELVA   1 Term      CS-LTranv   ELVA       Review of Systems  Review of Systems   Constitutional: Positive for appetite change and unexpected weight change.   Genitourinary: Positive for hot flashes and menstrual problem.   Psychiatric/Behavioral: Positive for sleep disturbance.   All other systems reviewed and are negative.          Objective:      Physical Exam:   Constitutional: She appears well-developed.     Eyes: Pupils are equal, round, and reactive to light. Conjunctivae and EOM are normal.    Neck: Normal range of motion. Neck supple.     Pulmonary/Chest: Effort normal.        Abdominal: Soft.             Musculoskeletal: Normal range of motion.       Neurological: She is alert.    Skin: Skin is warm.    Psychiatric: She has a normal mood and affect.           Assessment:        1. Symptomatic menopausal or female climacteric states    2. Decreased libido               Plan:      Reviewed menopause, must skip menses for 12 months  Reviewed options for hot flushes--estroven, black cohosh red clover, hernan " pau, amberen vs brisdelle vs clonidine vs estrogen/provera ; libido--increase effort to initiate sex; avlamil, testosterone; estratest  Pt to try otc avlamil for sex drive  Accepts rx for clonidine for sleep/hot flushes  Encouraged  diet, exercise, weight loss  F/u 6 wks if no improvement of symptoms--consider testosterone injection and clonidine vs hrt

## 2019-09-03 ENCOUNTER — LAB VISIT (OUTPATIENT)
Dept: LAB | Facility: HOSPITAL | Age: 54
End: 2019-09-03
Attending: INTERNAL MEDICINE
Payer: COMMERCIAL

## 2019-09-03 ENCOUNTER — PATIENT MESSAGE (OUTPATIENT)
Dept: INTERNAL MEDICINE | Facility: CLINIC | Age: 54
End: 2019-09-03

## 2019-09-03 ENCOUNTER — OFFICE VISIT (OUTPATIENT)
Dept: OBSTETRICS AND GYNECOLOGY | Facility: CLINIC | Age: 54
End: 2019-09-03
Payer: COMMERCIAL

## 2019-09-03 VITALS
SYSTOLIC BLOOD PRESSURE: 124 MMHG | WEIGHT: 174.19 LBS | HEIGHT: 63 IN | BODY MASS INDEX: 30.86 KG/M2 | DIASTOLIC BLOOD PRESSURE: 84 MMHG

## 2019-09-03 DIAGNOSIS — N95.1 MENOPAUSAL SYMPTOM: Primary | ICD-10-CM

## 2019-09-03 DIAGNOSIS — R68.82 DECREASED LIBIDO: ICD-10-CM

## 2019-09-03 DIAGNOSIS — Z12.31 SCREENING MAMMOGRAM, ENCOUNTER FOR: ICD-10-CM

## 2019-09-03 DIAGNOSIS — E78.5 HYPERLIPIDEMIA LDL GOAL <160: ICD-10-CM

## 2019-09-03 LAB
ALBUMIN SERPL BCP-MCNC: 3.9 G/DL (ref 3.5–5.2)
ALP SERPL-CCNC: 72 U/L (ref 55–135)
ALT SERPL W/O P-5'-P-CCNC: 15 U/L (ref 10–44)
ANION GAP SERPL CALC-SCNC: 8 MMOL/L (ref 8–16)
AST SERPL-CCNC: 22 U/L (ref 10–40)
BILIRUB SERPL-MCNC: 0.6 MG/DL (ref 0.1–1)
BUN SERPL-MCNC: 10 MG/DL (ref 6–20)
CALCIUM SERPL-MCNC: 9.9 MG/DL (ref 8.7–10.5)
CHLORIDE SERPL-SCNC: 106 MMOL/L (ref 95–110)
CHOLEST SERPL-MCNC: 234 MG/DL (ref 120–199)
CHOLEST/HDLC SERPL: 3.7 {RATIO} (ref 2–5)
CO2 SERPL-SCNC: 29 MMOL/L (ref 23–29)
CREAT SERPL-MCNC: 1 MG/DL (ref 0.5–1.4)
EST. GFR  (AFRICAN AMERICAN): >60 ML/MIN/1.73 M^2
EST. GFR  (NON AFRICAN AMERICAN): >60 ML/MIN/1.73 M^2
GLUCOSE SERPL-MCNC: 93 MG/DL (ref 70–110)
HDLC SERPL-MCNC: 64 MG/DL (ref 40–75)
HDLC SERPL: 27.4 % (ref 20–50)
LDLC SERPL CALC-MCNC: 155 MG/DL (ref 63–159)
NONHDLC SERPL-MCNC: 170 MG/DL
POTASSIUM SERPL-SCNC: 4.2 MMOL/L (ref 3.5–5.1)
PROT SERPL-MCNC: 7.1 G/DL (ref 6–8.4)
SODIUM SERPL-SCNC: 143 MMOL/L (ref 136–145)
TRIGL SERPL-MCNC: 75 MG/DL (ref 30–150)

## 2019-09-03 PROCEDURE — 99999 PR PBB SHADOW E&M-EST. PATIENT-LVL III: ICD-10-PCS | Mod: PBBFAC,,, | Performed by: OBSTETRICS & GYNECOLOGY

## 2019-09-03 PROCEDURE — 80061 LIPID PANEL: CPT

## 2019-09-03 PROCEDURE — 99213 OFFICE O/P EST LOW 20 MIN: CPT | Mod: S$GLB,,, | Performed by: OBSTETRICS & GYNECOLOGY

## 2019-09-03 PROCEDURE — 99213 PR OFFICE/OUTPT VISIT, EST, LEVL III, 20-29 MIN: ICD-10-PCS | Mod: S$GLB,,, | Performed by: OBSTETRICS & GYNECOLOGY

## 2019-09-03 PROCEDURE — 99999 PR PBB SHADOW E&M-EST. PATIENT-LVL III: CPT | Mod: PBBFAC,,, | Performed by: OBSTETRICS & GYNECOLOGY

## 2019-09-03 PROCEDURE — 36415 COLL VENOUS BLD VENIPUNCTURE: CPT

## 2019-09-03 PROCEDURE — 80053 COMPREHEN METABOLIC PANEL: CPT

## 2019-09-03 RX ORDER — IBUPROFEN 800 MG/1
TABLET ORAL
COMMUNITY
Start: 2019-09-02 | End: 2019-11-05

## 2019-09-03 RX ORDER — PENICILLIN V POTASSIUM 500 MG/1
TABLET, FILM COATED ORAL
COMMUNITY
Start: 2019-09-02 | End: 2019-11-05

## 2019-09-03 NOTE — PROGRESS NOTES
"Subjective:       Patient ID: Yessenia Glover is a 54 y.o. female.    Chief Complaint:  Consult (hot flashes)      History of Present Illness  HPI  here for follow up   Pt reports intermittent use of clonidine    Did not find avlimil    Using "hot flash" product from [employer]--feeling some relief (did not bring ingredient side of bottle)    Did not initiate intercourse      GYN & OB History  No LMP recorded. Patient is perimenopausal.   Date of Last Pap: No result found    OB History    Para Term  AB Living   2 2 2     2   SAB TAB Ectopic Multiple Live Births           2      # Outcome Date GA Lbr Kevin/2nd Weight Sex Delivery Anes PTL Lv   2 Term      CS-LTranv   ELVA   1 Term      CS-LTranv   ELVA       Review of Systems  Review of Systems   Endocrine: Positive for hot flashes.   Genitourinary: Positive for decreased libido.   All other systems reviewed and are negative.          Objective:      Physical Exam:   Constitutional: She appears well-developed.     Eyes: Pupils are equal, round, and reactive to light. Conjunctivae and EOM are normal.    Neck: Normal range of motion. Neck supple.     Pulmonary/Chest: Effort normal.        Abdominal: Soft.             Musculoskeletal: Normal range of motion.       Neurological: She is alert.    Skin: Skin is warm.    Psychiatric: She has a normal mood and affect.           Assessment:      Encounter Diagnoses   Name Primary?    Screening mammogram, encounter for     Menopausal symptom Yes    Decreased libido              Plan:      Continue "hot flash" medication  Advised nightly use of clonidine  Encouraged pt to initiate intercourse  Aware ww exam due after 2019     "

## 2019-09-10 ENCOUNTER — OFFICE VISIT (OUTPATIENT)
Dept: INTERNAL MEDICINE | Facility: CLINIC | Age: 54
End: 2019-09-10
Payer: COMMERCIAL

## 2019-09-10 VITALS
DIASTOLIC BLOOD PRESSURE: 70 MMHG | TEMPERATURE: 96 F | OXYGEN SATURATION: 98 % | HEART RATE: 72 BPM | WEIGHT: 169.31 LBS | HEIGHT: 63 IN | SYSTOLIC BLOOD PRESSURE: 110 MMHG | BODY MASS INDEX: 30 KG/M2

## 2019-09-10 DIAGNOSIS — M25.511 CHRONIC RIGHT SHOULDER PAIN: ICD-10-CM

## 2019-09-10 DIAGNOSIS — G89.29 CHRONIC RIGHT SHOULDER PAIN: ICD-10-CM

## 2019-09-10 DIAGNOSIS — E78.5 HYPERLIPIDEMIA LDL GOAL <160: Primary | ICD-10-CM

## 2019-09-10 PROBLEM — Z12.11 SPECIAL SCREENING FOR MALIGNANT NEOPLASMS, COLON: Status: RESOLVED | Noted: 2019-04-16 | Resolved: 2019-09-10

## 2019-09-10 PROBLEM — Z12.11 COLON CANCER SCREENING: Status: RESOLVED | Noted: 2019-04-16 | Resolved: 2019-09-10

## 2019-09-10 PROCEDURE — 99214 OFFICE O/P EST MOD 30 MIN: CPT | Mod: S$GLB,,, | Performed by: INTERNAL MEDICINE

## 2019-09-10 PROCEDURE — 99999 PR PBB SHADOW E&M-EST. PATIENT-LVL III: CPT | Mod: PBBFAC,,, | Performed by: INTERNAL MEDICINE

## 2019-09-10 PROCEDURE — 99214 PR OFFICE/OUTPT VISIT, EST, LEVL IV, 30-39 MIN: ICD-10-PCS | Mod: S$GLB,,, | Performed by: INTERNAL MEDICINE

## 2019-09-10 PROCEDURE — 99999 PR PBB SHADOW E&M-EST. PATIENT-LVL III: ICD-10-PCS | Mod: PBBFAC,,, | Performed by: INTERNAL MEDICINE

## 2019-09-10 RX ORDER — ATORVASTATIN CALCIUM 10 MG/1
10 TABLET, FILM COATED ORAL DAILY
Qty: 90 TABLET | Refills: 0 | Status: SHIPPED | OUTPATIENT
Start: 2019-09-10 | End: 2020-03-10 | Stop reason: SDUPTHER

## 2019-09-10 RX ORDER — MELOXICAM 7.5 MG/1
7.5 TABLET ORAL DAILY PRN
Qty: 30 TABLET | Refills: 0 | Status: SHIPPED | OUTPATIENT
Start: 2019-09-10 | End: 2020-03-10 | Stop reason: SDUPTHER

## 2019-09-10 NOTE — PROGRESS NOTES
Subjective:       Patient ID: Yessenia Glover is a 54 y.o. female.    Chief Complaint: Follow-up (6 month)    Yessenia Glover  54 y.o. Black or  female    Patient presents with:  Follow-up: 6 month    HPI: Here today to follow up on chronic conditions.  HLD--she has been taking an old prescription. She takes atorvastatin low dose a couple of times per week. She states it causes muscle/joint aches when she takes it more often. Her lipids have improved.                     LDLCALC                  155.0               09/03/2019            She has chronic right shoulder pain. She states meloxicam has worked in the past. She needs a new prescription. She is currently taking ibuprofen for a recent dental procedure but it does not help her shoulder pain. The pain is intermittent.     Past Medical History:  Hyperlipidemia  Hypertension    Current Outpatient Medications on File Prior to Visit:  Ca-D3-mag-zinc--leti-boron (CALTRATE 600-D PLUS MINERALS) 600 mg calcium- 800 unit-40 mg Chew, , Disp: , Rfl:   cloNIDine (CATAPRES) 0.1 MG tablet, Take 1 tablet (0.1 mg total) by mouth every evening., Disp: 30 tablet, Rfl: 6  fish oil-omega-3 fatty acids 300-1,000 mg capsule, Take by mouth once daily., Disp: , Rfl:   ibuprofen (ADVIL,MOTRIN) 800 MG tablet, , Disp: , Rfl:   MULTIVIT-IRON-MIN-FOLIC ACID 3,500-18-0.4 UNIT-MG-MG ORAL CHEW, Take by mouth once daily., Disp: , Rfl:   penicillin v potassium (VEETID) 500 MG tablet, , Disp: , Rfl:   ciclopirox (PENLAC) 8 % Soln, Apply to affected toenails at night time DAILY. On 7th day, file nails down, clean all nails with alcohol and restart application process., Disp: 1 Bottle, Rfl: 10  ketoconazole (NIZORAL) 2 % cream, AAA bid, Disp: 60 g, Rfl: 3  miconazole NITRATE 2 % (ZEASORB AF) 2 % top powder, Use two to three times daily to prevent rash, Disp: 85 g, Rfl: 11  terbinafine HCl (LAMISIL) 250 mg tablet, Take 1 tablet (250 mg total) by mouth once daily. 1 pill by  mouth x 90 days. Avoid alcohol intake while taking medication, Disp: 90 tablet, Rfl: 0  triamcinolone acetonide 0.025% (KENALOG) 0.025 % cream, Apply topically 2 (two) times daily as needed. Mild steroid., Disp: 80 g, Rfl: 1    Allergies:  Review of patient's allergies indicates:   -- Codeine -- Itching   -- Sulfa (sulfonamide antibiotics) -- Nausea And Vomiting      Review of Systems   Constitutional: Negative for activity change and unexpected weight change.   HENT: Negative for hearing loss, rhinorrhea and trouble swallowing.    Eyes: Negative for discharge and visual disturbance.   Respiratory: Negative for chest tightness and wheezing.    Cardiovascular: Negative for chest pain and palpitations.   Gastrointestinal: Negative for blood in stool, constipation, diarrhea and vomiting.   Endocrine: Negative for polydipsia and polyuria.   Genitourinary: Negative for difficulty urinating, dysuria, hematuria and menstrual problem.   Musculoskeletal: Positive for arthralgias. Negative for joint swelling and neck pain.   Neurological: Negative for weakness and headaches.   Psychiatric/Behavioral: Negative for confusion and dysphoric mood.       Objective:      Physical Exam   Constitutional: She is oriented to person, place, and time. She appears well-developed and well-nourished.   Eyes: No scleral icterus.   Neck: No tracheal deviation present.   Cardiovascular: Normal rate, regular rhythm and normal heart sounds.   Pulmonary/Chest: Effort normal and breath sounds normal. No respiratory distress.   Abdominal: Soft. Bowel sounds are normal.   Neurological: She is alert and oriented to person, place, and time.   Skin: Skin is warm and dry.   Psychiatric: She has a normal mood and affect.   Vitals reviewed.      Assessment:       1. Hyperlipidemia LDL goal <160    2. Chronic right shoulder pain        Plan:       Yessenia was seen today for follow-up.    Diagnoses and all orders for this visit:    Hyperlipidemia LDL goal  <160  -     atorvastatin (LIPITOR) 10 MG tablet; Take 1 tablet (10 mg total) by mouth once daily.  -     Lifestyle modifications discussed    Chronic right shoulder pain  -     meloxicam (MOBIC) 7.5 MG tablet; Take 1 tablet (7.5 mg total) by mouth daily as needed for Pain. Discussed medication risks and benefits.     Labs and f/u in 6 months and as needed.

## 2019-11-05 ENCOUNTER — HOSPITAL ENCOUNTER (OUTPATIENT)
Dept: RADIOLOGY | Facility: HOSPITAL | Age: 54
Discharge: HOME | End: 2019-11-05
Attending: OBSTETRICS & GYNECOLOGY
Payer: COMMERCIAL

## 2019-11-05 ENCOUNTER — OFFICE VISIT (OUTPATIENT)
Dept: OBSTETRICS AND GYNECOLOGY | Facility: CLINIC | Age: 54
End: 2019-11-05
Payer: COMMERCIAL

## 2019-11-05 ENCOUNTER — IMMUNIZATION (OUTPATIENT)
Dept: INTERNAL MEDICINE | Facility: CLINIC | Age: 54
End: 2019-11-05
Payer: COMMERCIAL

## 2019-11-05 VITALS
HEIGHT: 63 IN | BODY MASS INDEX: 30.35 KG/M2 | SYSTOLIC BLOOD PRESSURE: 124 MMHG | WEIGHT: 171.31 LBS | DIASTOLIC BLOOD PRESSURE: 78 MMHG

## 2019-11-05 DIAGNOSIS — Z12.4 SCREENING FOR CERVICAL CANCER: ICD-10-CM

## 2019-11-05 DIAGNOSIS — Z72.51 HIGH RISK HETEROSEXUAL BEHAVIOR: ICD-10-CM

## 2019-11-05 DIAGNOSIS — Z01.419 WELL WOMAN EXAM: Primary | ICD-10-CM

## 2019-11-05 DIAGNOSIS — N95.1 MENOPAUSAL SYMPTOM: ICD-10-CM

## 2019-11-05 DIAGNOSIS — Z11.3 ENCOUNTER FOR SCREENING EXAMINATION FOR SEXUALLY TRANSMITTED DISEASE: ICD-10-CM

## 2019-11-05 DIAGNOSIS — Z12.31 SCREENING MAMMOGRAM, ENCOUNTER FOR: ICD-10-CM

## 2019-11-05 DIAGNOSIS — Z01.419 ENCOUNTER FOR GYNECOLOGICAL EXAMINATION (GENERAL) (ROUTINE) WITHOUT ABNORMAL FINDINGS: ICD-10-CM

## 2019-11-05 PROCEDURE — 77067 SCR MAMMO BI INCL CAD: CPT | Mod: 26,,, | Performed by: RADIOLOGY

## 2019-11-05 PROCEDURE — 90686 FLU VACCINE (QUAD) GREATER THAN OR EQUAL TO 3YO PRESERVATIVE FREE IM: ICD-10-PCS | Mod: S$GLB,,, | Performed by: FAMILY MEDICINE

## 2019-11-05 PROCEDURE — 99396 PREV VISIT EST AGE 40-64: CPT | Mod: S$GLB,,, | Performed by: OBSTETRICS & GYNECOLOGY

## 2019-11-05 PROCEDURE — 99999 PR PBB SHADOW E&M-EST. PATIENT-LVL III: CPT | Mod: PBBFAC,,, | Performed by: OBSTETRICS & GYNECOLOGY

## 2019-11-05 PROCEDURE — 88175 CYTOPATH C/V AUTO FLUID REDO: CPT

## 2019-11-05 PROCEDURE — 99396 PR PREVENTIVE VISIT,EST,40-64: ICD-10-PCS | Mod: S$GLB,,, | Performed by: OBSTETRICS & GYNECOLOGY

## 2019-11-05 PROCEDURE — 77063 MAMMO DIGITAL SCREENING BILAT WITH TOMOSYNTHESIS_CAD: ICD-10-PCS | Mod: 26,,, | Performed by: RADIOLOGY

## 2019-11-05 PROCEDURE — 77067 SCR MAMMO BI INCL CAD: CPT | Mod: TC

## 2019-11-05 PROCEDURE — 90686 IIV4 VACC NO PRSV 0.5 ML IM: CPT | Mod: S$GLB,,, | Performed by: FAMILY MEDICINE

## 2019-11-05 PROCEDURE — 87661 TRICHOMONAS VAGINALIS AMPLIF: CPT

## 2019-11-05 PROCEDURE — 77063 BREAST TOMOSYNTHESIS BI: CPT | Mod: 26,,, | Performed by: RADIOLOGY

## 2019-11-05 PROCEDURE — 87481 CANDIDA DNA AMP PROBE: CPT | Mod: 59

## 2019-11-05 PROCEDURE — 87624 HPV HI-RISK TYP POOLED RSLT: CPT

## 2019-11-05 PROCEDURE — 77067 MAMMO DIGITAL SCREENING BILAT WITH TOMOSYNTHESIS_CAD: ICD-10-PCS | Mod: 26,,, | Performed by: RADIOLOGY

## 2019-11-05 PROCEDURE — 99999 PR PBB SHADOW E&M-EST. PATIENT-LVL III: ICD-10-PCS | Mod: PBBFAC,,, | Performed by: OBSTETRICS & GYNECOLOGY

## 2019-11-05 PROCEDURE — 87491 CHLMYD TRACH DNA AMP PROBE: CPT

## 2019-11-05 PROCEDURE — 90471 IMMUNIZATION ADMIN: CPT | Mod: S$GLB,,, | Performed by: FAMILY MEDICINE

## 2019-11-05 PROCEDURE — 90471 FLU VACCINE (QUAD) GREATER THAN OR EQUAL TO 3YO PRESERVATIVE FREE IM: ICD-10-PCS | Mod: S$GLB,,, | Performed by: FAMILY MEDICINE

## 2019-11-05 NOTE — PROGRESS NOTES
Subjective:       Patient ID: Yessenia Glover is a 54 y.o. female.    Chief Complaint:  Well Woman      History of Present Illness  HPI  Annual Exam-Postmenopausal  Patient presents for annual exam. The patient has no complaints today. The patient is sexually active, occas vaginal dryness, using ky; denies dysparuenia; . GYN screening history: last pap: approximate date  and was normal and last mammogram: approximate date 2019 and was normal. The patient has never been taking hormone replacement therapy. Patient denies post-menopausal vaginal bleeding. The patient wears seatbelts: yes. The patient participates in regular exercise: no--but does a lot of steps at [employer]. Has the patient ever been transfused or tattooed?: no. The patient reports that there is not domestic violence in her life.    Hot flushes better--using black cohosh;     Denies urinary leakage with cough/sneeze         GYN & OB History  Patient's last menstrual period was 2018 (approximate).   Date of Last Pap: No result found    OB History    Para Term  AB Living   2 2 2     2   SAB TAB Ectopic Multiple Live Births           2      # Outcome Date GA Lbr Kevin/2nd Weight Sex Delivery Anes PTL Lv   2 Term      CS-LTranv   ELVA   1 Term      CS-LTranv   ELVA       Review of Systems  Review of Systems   Genitourinary: Positive for decreased libido and hot flashes.   All other systems reviewed and are negative.          Objective:      Physical Exam:   Constitutional: She appears well-developed.     Eyes: Pupils are equal, round, and reactive to light. Conjunctivae and EOM are normal.    Neck: Normal range of motion. Neck supple.     Pulmonary/Chest: Effort normal. Right breast exhibits no mass, no nipple discharge, no skin change and no tenderness. Left breast exhibits no mass, no nipple discharge, no skin change and no tenderness. Breasts are symmetrical.        Abdominal: Soft.     Genitourinary: Rectum normal, vagina normal  and uterus normal. Pelvic exam was performed with patient supine. Cervix is normal. Right adnexum displays no mass and no tenderness. Left adnexum displays no mass and no tenderness. No erythema, bleeding, rectocele, cystocele or unspecified prolapse of vaginal walls in the vagina. No vaginal discharge found. Labial bartholins normal.Cervix exhibits no motion tenderness and no friability. Additional cervical findings: pap smear done  Genitourinary Comments: atrophy        Uterus Size: 6 cm   Musculoskeletal: Normal range of motion.       Neurological: She is alert.    Skin: Skin is warm.    Psychiatric: She has a normal mood and affect.           Assessment:     Encounter Diagnoses   Name Primary?    Well woman exam Yes    Encounter for screening examination for sexually transmitted disease     Screening for cervical cancer     High risk heterosexual behavior     Menopausal symptom     Encounter for gynecological examination (general) (routine) without abnormal findings                  Plan:      Continue annual well woman exam.  Pap today; Reviewed updated recommendations for pap smears (every 3 years) in low risk patients.   Recommend annual pelvic exams.  Reviewed recommendations for annual CBE.  Gc/ct affirm today per pt request  Hiv/rpr testing today  Safe sex  If no menses the rest of this month--then menopause  Continue black cohosh/clonidine prn hot flushes  Encouraged  diet, exercise, weight loss  Osteoporosis prevention; 1200mg calcium/d with source of vitamin d

## 2019-11-06 LAB
BACTERIAL VAGINOSIS DNA: NEGATIVE
CANDIDA GLABRATA DNA: NEGATIVE
CANDIDA KRUSEI DNA: NEGATIVE
CANDIDA RRNA VAG QL PROBE: NEGATIVE
T VAGINALIS RRNA GENITAL QL PROBE: NEGATIVE

## 2019-11-07 LAB
C TRACH DNA SPEC QL NAA+PROBE: NOT DETECTED
HPV HR 12 DNA CVX QL NAA+PROBE: NEGATIVE
HPV16 AG SPEC QL: NEGATIVE
HPV18 DNA SPEC QL NAA+PROBE: NEGATIVE
N GONORRHOEA DNA SPEC QL NAA+PROBE: NOT DETECTED

## 2020-03-03 ENCOUNTER — LAB VISIT (OUTPATIENT)
Dept: LAB | Facility: HOSPITAL | Age: 55
End: 2020-03-03
Attending: INTERNAL MEDICINE
Payer: COMMERCIAL

## 2020-03-03 DIAGNOSIS — E78.5 HYPERLIPIDEMIA LDL GOAL <160: ICD-10-CM

## 2020-03-03 LAB
ALBUMIN SERPL BCP-MCNC: 4 G/DL (ref 3.5–5.2)
ALP SERPL-CCNC: 79 U/L (ref 55–135)
ALT SERPL W/O P-5'-P-CCNC: 14 U/L (ref 10–44)
ANION GAP SERPL CALC-SCNC: 8 MMOL/L (ref 8–16)
AST SERPL-CCNC: 22 U/L (ref 10–40)
BILIRUB SERPL-MCNC: 0.5 MG/DL (ref 0.1–1)
BUN SERPL-MCNC: 14 MG/DL (ref 6–20)
CALCIUM SERPL-MCNC: 9.8 MG/DL (ref 8.7–10.5)
CHLORIDE SERPL-SCNC: 105 MMOL/L (ref 95–110)
CHOLEST SERPL-MCNC: 253 MG/DL (ref 120–199)
CHOLEST/HDLC SERPL: 3.5 {RATIO} (ref 2–5)
CO2 SERPL-SCNC: 28 MMOL/L (ref 23–29)
CREAT SERPL-MCNC: 1 MG/DL (ref 0.5–1.4)
EST. GFR  (AFRICAN AMERICAN): >60 ML/MIN/1.73 M^2
EST. GFR  (NON AFRICAN AMERICAN): >60 ML/MIN/1.73 M^2
GLUCOSE SERPL-MCNC: 95 MG/DL (ref 70–110)
HDLC SERPL-MCNC: 73 MG/DL (ref 40–75)
HDLC SERPL: 28.9 % (ref 20–50)
LDLC SERPL CALC-MCNC: 162.8 MG/DL (ref 63–159)
NONHDLC SERPL-MCNC: 180 MG/DL
POTASSIUM SERPL-SCNC: 3.8 MMOL/L (ref 3.5–5.1)
PROT SERPL-MCNC: 7.5 G/DL (ref 6–8.4)
SODIUM SERPL-SCNC: 141 MMOL/L (ref 136–145)
TRIGL SERPL-MCNC: 86 MG/DL (ref 30–150)

## 2020-03-03 PROCEDURE — 36415 COLL VENOUS BLD VENIPUNCTURE: CPT

## 2020-03-03 PROCEDURE — 80053 COMPREHEN METABOLIC PANEL: CPT

## 2020-03-03 PROCEDURE — 80061 LIPID PANEL: CPT

## 2020-03-10 ENCOUNTER — OFFICE VISIT (OUTPATIENT)
Dept: INTERNAL MEDICINE | Facility: CLINIC | Age: 55
End: 2020-03-10
Payer: COMMERCIAL

## 2020-03-10 VITALS
BODY MASS INDEX: 28.56 KG/M2 | HEART RATE: 71 BPM | WEIGHT: 161.19 LBS | OXYGEN SATURATION: 98 % | HEIGHT: 63 IN | SYSTOLIC BLOOD PRESSURE: 110 MMHG | TEMPERATURE: 98 F | DIASTOLIC BLOOD PRESSURE: 80 MMHG

## 2020-03-10 DIAGNOSIS — R23.2 HOT FLASHES: ICD-10-CM

## 2020-03-10 DIAGNOSIS — M25.511 CHRONIC RIGHT SHOULDER PAIN: ICD-10-CM

## 2020-03-10 DIAGNOSIS — E78.5 HYPERLIPIDEMIA LDL GOAL <160: Primary | ICD-10-CM

## 2020-03-10 DIAGNOSIS — G89.29 CHRONIC RIGHT SHOULDER PAIN: ICD-10-CM

## 2020-03-10 PROBLEM — E66.9 OBESITY (BMI 30.0-34.9): Status: RESOLVED | Noted: 2019-02-26 | Resolved: 2020-03-10

## 2020-03-10 PROBLEM — E66.811 OBESITY (BMI 30.0-34.9): Status: RESOLVED | Noted: 2019-02-26 | Resolved: 2020-03-10

## 2020-03-10 PROCEDURE — 99214 PR OFFICE/OUTPT VISIT, EST, LEVL IV, 30-39 MIN: ICD-10-PCS | Mod: S$GLB,,, | Performed by: INTERNAL MEDICINE

## 2020-03-10 PROCEDURE — 99999 PR PBB SHADOW E&M-EST. PATIENT-LVL III: CPT | Mod: PBBFAC,,, | Performed by: INTERNAL MEDICINE

## 2020-03-10 PROCEDURE — 99214 OFFICE O/P EST MOD 30 MIN: CPT | Mod: S$GLB,,, | Performed by: INTERNAL MEDICINE

## 2020-03-10 PROCEDURE — 99999 PR PBB SHADOW E&M-EST. PATIENT-LVL III: ICD-10-PCS | Mod: PBBFAC,,, | Performed by: INTERNAL MEDICINE

## 2020-03-10 RX ORDER — ATORVASTATIN CALCIUM 10 MG/1
10 TABLET, FILM COATED ORAL DAILY
Qty: 90 TABLET | Refills: 1 | Status: SHIPPED | OUTPATIENT
Start: 2020-03-10 | End: 2021-02-09 | Stop reason: SDUPTHER

## 2020-03-10 RX ORDER — MELOXICAM 7.5 MG/1
7.5 TABLET ORAL DAILY PRN
Qty: 30 TABLET | Refills: 1 | Status: SHIPPED | OUTPATIENT
Start: 2020-03-10 | End: 2020-09-10 | Stop reason: ALTCHOICE

## 2020-03-10 NOTE — PROGRESS NOTES
Subjective:       Patient ID: Yessenia Glover is a 55 y.o. female.    Chief Complaint: Follow-up (6 month)    Yessenia Glover  55 y.o. Black or  female    Patient presents with:  Follow-up: 6 month    HPI: Here today to follow up on chronic conditions.  HLD--worsened. She admits to not taking atorvastatin as prescribed. She reports occasional muscle aches when she takes it. The last time she had labs her lipids were better and she states she was taking the atorvastatin several times per week but not daily at that time.                      LDLCALC                  162.8 (H)           03/03/2020            Shoulder pain--chronic. She takes meloxicam as needed. She needs refills.   Hot flashes--she takes clonidine but not daily due to it making her groggy the next morning.     Past Medical History:  Hyperlipidemia  Hypertension    Current Outpatient Medications on File Prior to Visit:  Ca-D3-mag-zinc--leti-boron (CALTRATE 600-D PLUS MINERALS) 600 mg calcium- 800 unit-40 mg Chew, , Disp: , Rfl:   ciclopirox (PENLAC) 8 % Soln, Apply to affected toenails at night time DAILY. On 7th day, file nails down, clean all nails with alcohol and restart application process., Disp: 1 Bottle, Rfl: 10  cloNIDine (CATAPRES) 0.1 MG tablet, Take 1 tablet (0.1 mg total) by mouth every evening., Disp: 30 tablet, Rfl: 6  fish oil-omega-3 fatty acids 300-1,000 mg capsule, Take by mouth once daily., Disp: , Rfl:   ketoconazole (NIZORAL) 2 % cream, AAA bid, Disp: 60 g, Rfl: 3  MULTIVIT-IRON-MIN-FOLIC ACID 3,500-18-0.4 UNIT-MG-MG ORAL CHEW, Take by mouth once daily., Disp: , Rfl:   triamcinolone acetonide 0.025% (KENALOG) 0.025 % cream, Apply topically 2 (two) times daily as needed. Mild steroid., Disp: 80 g, Rfl: 1  atorvastatin (LIPITOR) 10 MG tablet, Take 1 tablet (10 mg total) by mouth once daily., Disp: 90 tablet, Rfl: 0  meloxicam (MOBIC) 7.5 MG tablet, Take 1 tablet (7.5 mg total) by mouth daily as needed for  Pain., Disp: 30 tablet, Rfl: 0  miconazole NITRATE 2 % (ZEASORB AF) 2 % top powder, Use two to three times daily to prevent rash (Patient not taking: Reported on 3/10/2020), Disp: 85 g, Rfl: 11    Allergies:  Review of patient's allergies indicates:   -- Codeine -- Itching   -- Sulfa (sulfonamide antibiotics) -- Nausea And Vomiting        Review of Systems   Constitutional: Negative for activity change and unexpected weight change.   HENT: Negative for hearing loss, rhinorrhea and trouble swallowing.    Eyes: Negative for discharge and visual disturbance.   Respiratory: Negative for chest tightness and wheezing.    Cardiovascular: Negative for chest pain and palpitations.   Gastrointestinal: Negative for abdominal pain, blood in stool, constipation, diarrhea and vomiting.   Endocrine: Negative for polydipsia.   Genitourinary: Negative for difficulty urinating, dysuria, hematuria and menstrual problem.   Musculoskeletal: Negative for arthralgias, joint swelling and neck pain.   Neurological: Negative for weakness and headaches.   Psychiatric/Behavioral: Negative for confusion and dysphoric mood.       Objective:      Physical Exam   Constitutional: She is oriented to person, place, and time. She appears well-developed and well-nourished. No distress.   Eyes: No scleral icterus.   Cardiovascular: Normal rate, regular rhythm and normal heart sounds.   Pulmonary/Chest: Effort normal and breath sounds normal. No respiratory distress.   Abdominal: Soft. Bowel sounds are normal.   Neurological: She is alert and oriented to person, place, and time.   Skin: Skin is warm and dry.   Psychiatric: She has a normal mood and affect.   Vitals reviewed.      Assessment:       1. Hyperlipidemia LDL goal <160    2. Chronic right shoulder pain    3. Hot flashes        Plan:       Yessenia was seen today for follow-up.    Diagnoses and all orders for this visit:    Hyperlipidemia LDL goal <160  -     Counseled regarding medication  compliance  -     atorvastatin (LIPITOR) 10 MG tablet; Take 1 tablet (10 mg total) by mouth once daily.    Chronic right shoulder pain  -     meloxicam (MOBIC) 7.5 MG tablet; Take 1 tablet (7.5 mg total) by mouth daily as needed for Pain.    Hot flashes  -     Continue clonidine    Labs and f/u in 6 months.

## 2020-09-03 ENCOUNTER — LAB VISIT (OUTPATIENT)
Dept: LAB | Facility: HOSPITAL | Age: 55
End: 2020-09-03
Attending: INTERNAL MEDICINE
Payer: COMMERCIAL

## 2020-09-03 DIAGNOSIS — E78.5 HYPERLIPIDEMIA LDL GOAL <160: ICD-10-CM

## 2020-09-03 LAB
ALBUMIN SERPL BCP-MCNC: 3.8 G/DL (ref 3.5–5.2)
ALP SERPL-CCNC: 67 U/L (ref 55–135)
ALT SERPL W/O P-5'-P-CCNC: 15 U/L (ref 10–44)
ANION GAP SERPL CALC-SCNC: 7 MMOL/L (ref 8–16)
AST SERPL-CCNC: 20 U/L (ref 10–40)
BILIRUB SERPL-MCNC: 0.4 MG/DL (ref 0.1–1)
BUN SERPL-MCNC: 10 MG/DL (ref 6–20)
CALCIUM SERPL-MCNC: 9.2 MG/DL (ref 8.7–10.5)
CHLORIDE SERPL-SCNC: 108 MMOL/L (ref 95–110)
CHOLEST SERPL-MCNC: 223 MG/DL (ref 120–199)
CHOLEST/HDLC SERPL: 3.5 {RATIO} (ref 2–5)
CO2 SERPL-SCNC: 29 MMOL/L (ref 23–29)
CREAT SERPL-MCNC: 1 MG/DL (ref 0.5–1.4)
EST. GFR  (AFRICAN AMERICAN): >60 ML/MIN/1.73 M^2
EST. GFR  (NON AFRICAN AMERICAN): >60 ML/MIN/1.73 M^2
GLUCOSE SERPL-MCNC: 95 MG/DL (ref 70–110)
HDLC SERPL-MCNC: 63 MG/DL (ref 40–75)
HDLC SERPL: 28.3 % (ref 20–50)
LDLC SERPL CALC-MCNC: 144.6 MG/DL (ref 63–159)
NONHDLC SERPL-MCNC: 160 MG/DL
POTASSIUM SERPL-SCNC: 3.9 MMOL/L (ref 3.5–5.1)
PROT SERPL-MCNC: 6.8 G/DL (ref 6–8.4)
SODIUM SERPL-SCNC: 144 MMOL/L (ref 136–145)
TRIGL SERPL-MCNC: 77 MG/DL (ref 30–150)

## 2020-09-03 PROCEDURE — 80061 LIPID PANEL: CPT

## 2020-09-03 PROCEDURE — 36415 COLL VENOUS BLD VENIPUNCTURE: CPT

## 2020-09-03 PROCEDURE — 80053 COMPREHEN METABOLIC PANEL: CPT

## 2020-09-10 ENCOUNTER — OFFICE VISIT (OUTPATIENT)
Dept: INTERNAL MEDICINE | Facility: CLINIC | Age: 55
End: 2020-09-10
Payer: COMMERCIAL

## 2020-09-10 VITALS
WEIGHT: 169.56 LBS | DIASTOLIC BLOOD PRESSURE: 80 MMHG | BODY MASS INDEX: 30.04 KG/M2 | OXYGEN SATURATION: 98 % | HEIGHT: 63 IN | TEMPERATURE: 97 F | SYSTOLIC BLOOD PRESSURE: 100 MMHG | HEART RATE: 75 BPM

## 2020-09-10 DIAGNOSIS — Z11.59 NEED FOR HEPATITIS C SCREENING TEST: ICD-10-CM

## 2020-09-10 DIAGNOSIS — G89.29 CHRONIC RIGHT SHOULDER PAIN: ICD-10-CM

## 2020-09-10 DIAGNOSIS — R23.2 HOT FLASHES: ICD-10-CM

## 2020-09-10 DIAGNOSIS — E78.5 HYPERLIPIDEMIA LDL GOAL <160: Primary | ICD-10-CM

## 2020-09-10 DIAGNOSIS — Z78.0 MENOPAUSE: ICD-10-CM

## 2020-09-10 DIAGNOSIS — M25.511 CHRONIC RIGHT SHOULDER PAIN: ICD-10-CM

## 2020-09-10 DIAGNOSIS — Z71.85 IMMUNIZATION COUNSELING: ICD-10-CM

## 2020-09-10 PROCEDURE — 99999 PR PBB SHADOW E&M-EST. PATIENT-LVL IV: ICD-10-PCS | Mod: PBBFAC,,, | Performed by: INTERNAL MEDICINE

## 2020-09-10 PROCEDURE — 99999 PR PBB SHADOW E&M-EST. PATIENT-LVL IV: CPT | Mod: PBBFAC,,, | Performed by: INTERNAL MEDICINE

## 2020-09-10 PROCEDURE — 99214 OFFICE O/P EST MOD 30 MIN: CPT | Mod: S$GLB,,, | Performed by: INTERNAL MEDICINE

## 2020-09-10 PROCEDURE — 99214 PR OFFICE/OUTPT VISIT, EST, LEVL IV, 30-39 MIN: ICD-10-PCS | Mod: S$GLB,,, | Performed by: INTERNAL MEDICINE

## 2020-09-10 RX ORDER — NAPROXEN 500 MG/1
500 TABLET ORAL 2 TIMES DAILY
Qty: 20 TABLET | Refills: 0 | Status: SHIPPED | OUTPATIENT
Start: 2020-09-10 | End: 2020-09-20

## 2020-09-10 RX ORDER — PHENOL 1.4 %
AEROSOL, SPRAY (ML) MUCOUS MEMBRANE
COMMUNITY

## 2020-09-10 NOTE — PROGRESS NOTES
Subjective:       Patient ID: Yessenia Glover is a 55 y.o. female.    Chief Complaint: Follow-up (6 month)    Yessenia Glover  55 y.o. Black or  female    Patient presents with:  Follow-up: 6 month    HPI: Here today to follow up on chronic conditions.  HLD--improved. She takes atorvastatin a couple of times per week.                   LDLCALC                  144.6               09/03/2020                 CHOL                     223 (H)             09/03/2020                 TRIG                     77                  09/03/2020                 HDL                      63                  09/03/2020                 ALT                      15                  09/03/2020                 AST                      20                  09/03/2020                 NA                       144                 09/03/2020                 K                        3.9                 09/03/2020                 CL                       108                 09/03/2020                 CREATININE               1.0                 09/03/2020                 BUN                      10                  09/03/2020                 CO2                      29                  09/03/2020            Shoulder pain--right. She has chronic pain but feels lately it has flared up. She usually takes meloxicam but does not feel it is working. She would like to try naproxen because it has worked in the past.   Hot flashes--she takes clonidine on occasion. She also has tried Black Cohosh and it seems to have helped. She has not had a period in one year.     Past Medical History:  Hyperlipidemia  Hypertension    Current Outpatient Medications on File Prior to Visit:  ascorbic acid/multivit-min (EMERGEN-C ORAL), Take by mouth., Disp: , Rfl:   atorvastatin (LIPITOR) 10 MG tablet, Take 1 tablet (10 mg total) by mouth once daily., Disp: 90 tablet, Rfl: 1  BLACK COHOSH ORAL, Take 40 mg by mouth. Pt takes this medication for Hot  flashes once daily, Disp: , Rfl:   Ca-D3-mag-zinc--leti-boron (CALTRATE 600-D PLUS MINERALS) 600 mg calcium- 800 unit-40 mg Chew, , Disp: , Rfl:   ciclopirox (PENLAC) 8 % Soln, Apply to affected toenails at night time DAILY. On 7th day, file nails down, clean all nails with alcohol and restart application process., Disp: 1 Bottle, Rfl: 10  cloNIDine (CATAPRES) 0.1 MG tablet, Take 1 tablet (0.1 mg total) by mouth every evening., Disp: 30 tablet, Rfl: 6  fish oil-omega-3 fatty acids 300-1,000 mg capsule, Take by mouth once daily., Disp: , Rfl:   MULTIVIT-IRON-MIN-FOLIC ACID 3,500-18-0.4 UNIT-MG-MG ORAL CHEW, Take by mouth once daily., Disp: , Rfl:   multivitamin-min-iron-FA-vit K (ADULTS MULTIVITAMIN) 18 mg iron-400 mcg-25 mcg Tab, , Disp: , Rfl:   meloxicam (MOBIC) 7.5 MG tablet, Take 1 tablet (7.5 mg total) by mouth daily as needed for Pain., Disp: 30 tablet, Rfl: 1    Allergies:  Review of patient's allergies indicates:   -- Codeine -- Itching   -- Sulfa (sulfonamide antibiotics) -- Nausea And Vomiting        Review of Systems   Constitutional: Negative for fever.   Respiratory: Negative for shortness of breath.    Cardiovascular: Negative for chest pain.   Genitourinary: Positive for hot flashes and menstrual problem.   Musculoskeletal: Positive for arthralgias and myalgias.         Objective:      Physical Exam  Vitals signs reviewed.   Constitutional:       General: She is not in acute distress.     Appearance: She is well-developed. She is not ill-appearing.   Eyes:      General: No scleral icterus.  Cardiovascular:      Rate and Rhythm: Normal rate.   Pulmonary:      Effort: Pulmonary effort is normal. No respiratory distress.   Neurological:      Mental Status: She is alert and oriented to person, place, and time.   Psychiatric:         Behavior: Behavior normal.         Thought Content: Thought content normal.         Judgment: Judgment normal.         Assessment:       1. Hyperlipidemia LDL goal <160     2. Chronic right shoulder pain    3. Hot flashes    4. Menopause    5. Need for hepatitis C screening test    6. Immunization counseling        Plan:       Yessenia was seen today for follow-up.    Diagnoses and all orders for this visit:    Hyperlipidemia LDL goal <160  -     Continue atorvastatin    Chronic right shoulder pain  -     naproxen (NAPROSYN) 500 MG tablet; Take 1 tablet (500 mg total) by mouth 2 (two) times daily. for 10 days    Hot flashes  -     Continue current management  -     Advised to limit caffeine    Menopause  -     Reassurance    Need for hepatitis C screening test  -     Hepatitis C Antibody; Future    Immunization counseling  -     Recommend influenza vaccine    RTC in 6 months for an annual exam.

## 2020-11-09 ENCOUNTER — PATIENT OUTREACH (OUTPATIENT)
Dept: ADMINISTRATIVE | Facility: OTHER | Age: 55
End: 2020-11-09

## 2020-11-10 ENCOUNTER — OFFICE VISIT (OUTPATIENT)
Dept: OBSTETRICS AND GYNECOLOGY | Facility: CLINIC | Age: 55
End: 2020-11-10
Payer: COMMERCIAL

## 2020-11-10 ENCOUNTER — HOSPITAL ENCOUNTER (OUTPATIENT)
Dept: RADIOLOGY | Facility: HOSPITAL | Age: 55
Discharge: HOME | End: 2020-11-10
Attending: OBSTETRICS & GYNECOLOGY
Payer: COMMERCIAL

## 2020-11-10 VITALS
DIASTOLIC BLOOD PRESSURE: 72 MMHG | BODY MASS INDEX: 29.95 KG/M2 | SYSTOLIC BLOOD PRESSURE: 116 MMHG | WEIGHT: 169 LBS | HEIGHT: 63 IN

## 2020-11-10 VITALS — BODY MASS INDEX: 29.95 KG/M2 | HEIGHT: 63 IN | WEIGHT: 169 LBS

## 2020-11-10 DIAGNOSIS — Z12.4 SCREENING FOR CERVICAL CANCER: ICD-10-CM

## 2020-11-10 DIAGNOSIS — Z11.3 ENCOUNTER FOR SCREENING EXAMINATION FOR SEXUALLY TRANSMITTED DISEASE: Primary | ICD-10-CM

## 2020-11-10 DIAGNOSIS — Z12.31 SCREENING MAMMOGRAM, ENCOUNTER FOR: ICD-10-CM

## 2020-11-10 DIAGNOSIS — N95.1 SYMPTOMATIC MENOPAUSAL OR FEMALE CLIMACTERIC STATES: ICD-10-CM

## 2020-11-10 PROCEDURE — 99396 PREV VISIT EST AGE 40-64: CPT | Mod: S$GLB,,, | Performed by: OBSTETRICS & GYNECOLOGY

## 2020-11-10 PROCEDURE — 88175 CYTOPATH C/V AUTO FLUID REDO: CPT

## 2020-11-10 PROCEDURE — 99999 PR PBB SHADOW E&M-EST. PATIENT-LVL III: CPT | Mod: PBBFAC,,, | Performed by: OBSTETRICS & GYNECOLOGY

## 2020-11-10 PROCEDURE — 77063 MAMMO DIGITAL SCREENING BILAT WITH TOMO: ICD-10-PCS | Mod: 26,,, | Performed by: RADIOLOGY

## 2020-11-10 PROCEDURE — 77067 MAMMO DIGITAL SCREENING BILAT WITH TOMO: ICD-10-PCS | Mod: 26,,, | Performed by: RADIOLOGY

## 2020-11-10 PROCEDURE — 77067 SCR MAMMO BI INCL CAD: CPT | Mod: 26,,, | Performed by: RADIOLOGY

## 2020-11-10 PROCEDURE — 77067 SCR MAMMO BI INCL CAD: CPT | Mod: TC,PO

## 2020-11-10 PROCEDURE — 77063 BREAST TOMOSYNTHESIS BI: CPT | Mod: 26,,, | Performed by: RADIOLOGY

## 2020-11-10 PROCEDURE — 99396 PR PREVENTIVE VISIT,EST,40-64: ICD-10-PCS | Mod: S$GLB,,, | Performed by: OBSTETRICS & GYNECOLOGY

## 2020-11-10 PROCEDURE — 99999 PR PBB SHADOW E&M-EST. PATIENT-LVL III: ICD-10-PCS | Mod: PBBFAC,,, | Performed by: OBSTETRICS & GYNECOLOGY

## 2020-11-10 RX ORDER — CLONIDINE HYDROCHLORIDE 0.1 MG/1
0.1 TABLET ORAL NIGHTLY
Qty: 30 TABLET | Refills: 6 | Status: SHIPPED | OUTPATIENT
Start: 2020-11-10 | End: 2022-03-29 | Stop reason: SDUPTHER

## 2020-11-10 NOTE — PROGRESS NOTES
Health Maintenance Due   Topic Date Due    Hepatitis C Screening  1965    Shingles Vaccine (1 of 2) 02/11/2017    Influenza Vaccine (1) 08/01/2020     Updates were requested from care everywhere.  Chart was reviewed for overdue Proactive [facility] Encounters ([facility]) topics (CRS, Breast Cancer Screening, Eye exam)  Health Maintenance has been updated.  LINKS immunization registry triggered.  Immunizations were reconciled.

## 2020-12-05 ENCOUNTER — PATIENT MESSAGE (OUTPATIENT)
Dept: OBSTETRICS AND GYNECOLOGY | Facility: CLINIC | Age: 55
End: 2020-12-05

## 2020-12-17 LAB
FINAL PATHOLOGIC DIAGNOSIS: NORMAL
Lab: NORMAL

## 2021-02-09 ENCOUNTER — PATIENT MESSAGE (OUTPATIENT)
Dept: OBSTETRICS AND GYNECOLOGY | Facility: CLINIC | Age: 56
End: 2021-02-09

## 2021-02-09 DIAGNOSIS — B37.31 YEAST VAGINITIS: Primary | ICD-10-CM

## 2021-02-09 DIAGNOSIS — E78.5 HYPERLIPIDEMIA LDL GOAL <160: ICD-10-CM

## 2021-02-09 RX ORDER — FLUCONAZOLE 200 MG/1
200 TABLET ORAL DAILY
Qty: 3 TABLET | Refills: 0 | Status: SHIPPED | OUTPATIENT
Start: 2021-02-09 | End: 2021-05-24

## 2021-02-10 RX ORDER — ATORVASTATIN CALCIUM 10 MG/1
10 TABLET, FILM COATED ORAL DAILY
Qty: 90 TABLET | Refills: 1 | Status: SHIPPED | OUTPATIENT
Start: 2021-02-10 | End: 2022-03-29 | Stop reason: SDUPTHER

## 2021-03-03 ENCOUNTER — LAB VISIT (OUTPATIENT)
Dept: LAB | Facility: HOSPITAL | Age: 56
End: 2021-03-03
Attending: INTERNAL MEDICINE
Payer: COMMERCIAL

## 2021-03-03 ENCOUNTER — CLINICAL SUPPORT (OUTPATIENT)
Dept: INTERNAL MEDICINE | Facility: CLINIC | Age: 56
End: 2021-03-03
Payer: COMMERCIAL

## 2021-03-03 VITALS — HEART RATE: 69 BPM | DIASTOLIC BLOOD PRESSURE: 84 MMHG | SYSTOLIC BLOOD PRESSURE: 122 MMHG

## 2021-03-03 DIAGNOSIS — E78.5 HYPERLIPIDEMIA LDL GOAL <160: ICD-10-CM

## 2021-03-03 DIAGNOSIS — Z11.59 NEED FOR HEPATITIS C SCREENING TEST: ICD-10-CM

## 2021-03-03 LAB
ALBUMIN SERPL BCP-MCNC: 3.8 G/DL (ref 3.5–5.2)
ALP SERPL-CCNC: 74 U/L (ref 55–135)
ALT SERPL W/O P-5'-P-CCNC: 18 U/L (ref 10–44)
ANION GAP SERPL CALC-SCNC: 11 MMOL/L (ref 8–16)
AST SERPL-CCNC: 24 U/L (ref 10–40)
BILIRUB SERPL-MCNC: 0.5 MG/DL (ref 0.1–1)
BUN SERPL-MCNC: 9 MG/DL (ref 6–20)
CALCIUM SERPL-MCNC: 9.5 MG/DL (ref 8.7–10.5)
CHLORIDE SERPL-SCNC: 108 MMOL/L (ref 95–110)
CHOLEST SERPL-MCNC: 191 MG/DL (ref 120–199)
CHOLEST/HDLC SERPL: 3.2 {RATIO} (ref 2–5)
CO2 SERPL-SCNC: 27 MMOL/L (ref 23–29)
CREAT SERPL-MCNC: 1.1 MG/DL (ref 0.5–1.4)
EST. GFR  (AFRICAN AMERICAN): >60 ML/MIN/1.73 M^2
EST. GFR  (NON AFRICAN AMERICAN): 57.1 ML/MIN/1.73 M^2
GLUCOSE SERPL-MCNC: 92 MG/DL (ref 70–110)
HDLC SERPL-MCNC: 59 MG/DL (ref 40–75)
HDLC SERPL: 30.9 % (ref 20–50)
LDLC SERPL CALC-MCNC: 103.4 MG/DL (ref 63–159)
NONHDLC SERPL-MCNC: 132 MG/DL
POTASSIUM SERPL-SCNC: 4.3 MMOL/L (ref 3.5–5.1)
PROT SERPL-MCNC: 7.1 G/DL (ref 6–8.4)
SODIUM SERPL-SCNC: 146 MMOL/L (ref 136–145)
TRIGL SERPL-MCNC: 143 MG/DL (ref 30–150)

## 2021-03-03 PROCEDURE — 99999 PR PBB SHADOW E&M-EST. PATIENT-LVL I: CPT | Mod: PBBFAC,,,

## 2021-03-03 PROCEDURE — 86803 HEPATITIS C AB TEST: CPT | Performed by: INTERNAL MEDICINE

## 2021-03-03 PROCEDURE — 99999 PR PBB SHADOW E&M-EST. PATIENT-LVL I: ICD-10-PCS | Mod: PBBFAC,,,

## 2021-03-03 PROCEDURE — 80061 LIPID PANEL: CPT | Performed by: INTERNAL MEDICINE

## 2021-03-03 PROCEDURE — 36415 COLL VENOUS BLD VENIPUNCTURE: CPT

## 2021-03-03 PROCEDURE — 80053 COMPREHEN METABOLIC PANEL: CPT | Performed by: INTERNAL MEDICINE

## 2021-03-04 LAB — HCV AB SERPL QL IA: NEGATIVE

## 2021-03-10 ENCOUNTER — OFFICE VISIT (OUTPATIENT)
Dept: INTERNAL MEDICINE | Facility: CLINIC | Age: 56
End: 2021-03-10
Payer: COMMERCIAL

## 2021-03-10 VITALS
SYSTOLIC BLOOD PRESSURE: 118 MMHG | TEMPERATURE: 97 F | BODY MASS INDEX: 30.9 KG/M2 | HEART RATE: 91 BPM | WEIGHT: 174.38 LBS | OXYGEN SATURATION: 98 % | DIASTOLIC BLOOD PRESSURE: 80 MMHG | HEIGHT: 63 IN

## 2021-03-10 DIAGNOSIS — Z00.00 ANNUAL PHYSICAL EXAM: Primary | ICD-10-CM

## 2021-03-10 DIAGNOSIS — E78.5 HYPERLIPIDEMIA LDL GOAL <160: ICD-10-CM

## 2021-03-10 PROCEDURE — 99999 PR PBB SHADOW E&M-EST. PATIENT-LVL III: ICD-10-PCS | Mod: PBBFAC,,, | Performed by: INTERNAL MEDICINE

## 2021-03-10 PROCEDURE — 99999 PR PBB SHADOW E&M-EST. PATIENT-LVL III: CPT | Mod: PBBFAC,,, | Performed by: INTERNAL MEDICINE

## 2021-03-10 PROCEDURE — 99396 PREV VISIT EST AGE 40-64: CPT | Mod: S$GLB,,, | Performed by: INTERNAL MEDICINE

## 2021-03-10 PROCEDURE — 99396 PR PREVENTIVE VISIT,EST,40-64: ICD-10-PCS | Mod: S$GLB,,, | Performed by: INTERNAL MEDICINE

## 2021-04-06 ENCOUNTER — OFFICE VISIT (OUTPATIENT)
Dept: OBSTETRICS AND GYNECOLOGY | Facility: CLINIC | Age: 56
End: 2021-04-06
Payer: COMMERCIAL

## 2021-04-06 ENCOUNTER — TELEPHONE (OUTPATIENT)
Dept: RADIOLOGY | Facility: HOSPITAL | Age: 56
End: 2021-04-06

## 2021-04-06 VITALS
SYSTOLIC BLOOD PRESSURE: 120 MMHG | DIASTOLIC BLOOD PRESSURE: 82 MMHG | WEIGHT: 177.94 LBS | HEIGHT: 63 IN | BODY MASS INDEX: 31.53 KG/M2

## 2021-04-06 DIAGNOSIS — R35.0 URINARY FREQUENCY: Primary | ICD-10-CM

## 2021-04-06 DIAGNOSIS — N94.10 DYSPAREUNIA, FEMALE: ICD-10-CM

## 2021-04-06 DIAGNOSIS — R10.2 PELVIC PAIN: ICD-10-CM

## 2021-04-06 DIAGNOSIS — N95.2 VAGINAL ATROPHY: ICD-10-CM

## 2021-04-06 PROCEDURE — 99214 PR OFFICE/OUTPT VISIT, EST, LEVL IV, 30-39 MIN: ICD-10-PCS | Mod: S$GLB,,, | Performed by: OBSTETRICS & GYNECOLOGY

## 2021-04-06 PROCEDURE — 87086 URINE CULTURE/COLONY COUNT: CPT | Performed by: OBSTETRICS & GYNECOLOGY

## 2021-04-06 PROCEDURE — 99214 OFFICE O/P EST MOD 30 MIN: CPT | Mod: S$GLB,,, | Performed by: OBSTETRICS & GYNECOLOGY

## 2021-04-06 PROCEDURE — 99999 PR PBB SHADOW E&M-EST. PATIENT-LVL III: CPT | Mod: PBBFAC,,, | Performed by: OBSTETRICS & GYNECOLOGY

## 2021-04-06 PROCEDURE — 99999 PR PBB SHADOW E&M-EST. PATIENT-LVL III: ICD-10-PCS | Mod: PBBFAC,,, | Performed by: OBSTETRICS & GYNECOLOGY

## 2021-04-06 RX ORDER — CHLORHEXIDINE GLUCONATE ORAL RINSE 1.2 MG/ML
SOLUTION DENTAL
COMMUNITY
Start: 2021-03-24

## 2021-04-06 RX ORDER — ESTRADIOL 10 UG/1
INSERT VAGINAL
Qty: 18 TABLET | Refills: 0 | Status: SHIPPED | OUTPATIENT
Start: 2021-04-06 | End: 2022-03-29 | Stop reason: ALTCHOICE

## 2021-04-06 RX ORDER — AMOXICILLIN 500 MG/1
500 TABLET, FILM COATED ORAL 3 TIMES DAILY
COMMUNITY
Start: 2021-03-24 | End: 2022-03-29

## 2021-04-07 ENCOUNTER — HOSPITAL ENCOUNTER (OUTPATIENT)
Dept: RADIOLOGY | Facility: HOSPITAL | Age: 56
Discharge: HOME | End: 2021-04-07
Attending: OBSTETRICS & GYNECOLOGY
Payer: COMMERCIAL

## 2021-04-07 DIAGNOSIS — R10.2 PELVIC PAIN: ICD-10-CM

## 2021-04-07 PROCEDURE — 76856 US EXAM PELVIC COMPLETE: CPT | Mod: TC

## 2021-04-07 PROCEDURE — 76856 US EXAM PELVIC COMPLETE: CPT | Mod: 26,,, | Performed by: RADIOLOGY

## 2021-04-07 PROCEDURE — 76830 TRANSVAGINAL US NON-OB: CPT | Mod: 26,,, | Performed by: RADIOLOGY

## 2021-04-07 PROCEDURE — 76856 US PELVIS COMP WITH TRANSVAG NON-OB (XPD): ICD-10-PCS | Mod: 26,,, | Performed by: RADIOLOGY

## 2021-04-07 PROCEDURE — 76830 US PELVIS COMP WITH TRANSVAG NON-OB (XPD): ICD-10-PCS | Mod: 26,,, | Performed by: RADIOLOGY

## 2021-04-08 LAB — BACTERIA UR CULT: NO GROWTH

## 2021-04-28 ENCOUNTER — PATIENT MESSAGE (OUTPATIENT)
Dept: RESEARCH | Facility: HOSPITAL | Age: 56
End: 2021-04-28

## 2021-07-08 ENCOUNTER — OFFICE VISIT (OUTPATIENT)
Dept: URGENT CARE | Facility: CLINIC | Age: 56
End: 2021-07-08
Payer: COMMERCIAL

## 2021-07-08 VITALS
TEMPERATURE: 98 F | SYSTOLIC BLOOD PRESSURE: 130 MMHG | OXYGEN SATURATION: 100 % | DIASTOLIC BLOOD PRESSURE: 76 MMHG | WEIGHT: 165.38 LBS | HEART RATE: 68 BPM | BODY MASS INDEX: 29.29 KG/M2

## 2021-07-08 DIAGNOSIS — L25.9 CONTACT DERMATITIS, UNSPECIFIED CONTACT DERMATITIS TYPE, UNSPECIFIED TRIGGER: Primary | ICD-10-CM

## 2021-07-08 PROCEDURE — 99999 PR PBB SHADOW E&M-EST. PATIENT-LVL IV: CPT | Mod: PBBFAC,,, | Performed by: PHYSICIAN ASSISTANT

## 2021-07-08 PROCEDURE — 99999 PR PBB SHADOW E&M-EST. PATIENT-LVL IV: ICD-10-PCS | Mod: PBBFAC,,, | Performed by: PHYSICIAN ASSISTANT

## 2021-07-08 PROCEDURE — 99214 OFFICE O/P EST MOD 30 MIN: CPT | Mod: S$GLB,,, | Performed by: PHYSICIAN ASSISTANT

## 2021-07-08 PROCEDURE — 99214 PR OFFICE/OUTPT VISIT, EST, LEVL IV, 30-39 MIN: ICD-10-PCS | Mod: S$GLB,,, | Performed by: PHYSICIAN ASSISTANT

## 2021-07-08 RX ORDER — TRIAMCINOLONE ACETONIDE 0.25 MG/G
CREAM TOPICAL 2 TIMES DAILY
Qty: 15 G | Refills: 0 | Status: SHIPPED | OUTPATIENT
Start: 2021-07-08 | End: 2022-03-29

## 2021-09-07 ENCOUNTER — LAB VISIT (OUTPATIENT)
Dept: LAB | Facility: HOSPITAL | Age: 56
End: 2021-09-07
Payer: COMMERCIAL

## 2021-09-07 DIAGNOSIS — E78.5 HYPERLIPIDEMIA LDL GOAL <160: ICD-10-CM

## 2021-09-07 LAB
ALBUMIN SERPL BCP-MCNC: 3.7 G/DL (ref 3.5–5.2)
ALP SERPL-CCNC: 69 U/L (ref 55–135)
ALT SERPL W/O P-5'-P-CCNC: 14 U/L (ref 10–44)
ANION GAP SERPL CALC-SCNC: 10 MMOL/L (ref 8–16)
AST SERPL-CCNC: 22 U/L (ref 10–40)
BILIRUB SERPL-MCNC: 0.4 MG/DL (ref 0.1–1)
BUN SERPL-MCNC: 11 MG/DL (ref 6–20)
CALCIUM SERPL-MCNC: 9.5 MG/DL (ref 8.7–10.5)
CHLORIDE SERPL-SCNC: 105 MMOL/L (ref 95–110)
CHOLEST SERPL-MCNC: 228 MG/DL (ref 120–199)
CHOLEST/HDLC SERPL: 3.6 {RATIO} (ref 2–5)
CO2 SERPL-SCNC: 24 MMOL/L (ref 23–29)
CREAT SERPL-MCNC: 0.9 MG/DL (ref 0.5–1.4)
EST. GFR  (AFRICAN AMERICAN): >60 ML/MIN/1.73 M^2
EST. GFR  (NON AFRICAN AMERICAN): >60 ML/MIN/1.73 M^2
GLUCOSE SERPL-MCNC: 86 MG/DL (ref 70–110)
HDLC SERPL-MCNC: 64 MG/DL (ref 40–75)
HDLC SERPL: 28.1 % (ref 20–50)
LDLC SERPL CALC-MCNC: 142.4 MG/DL (ref 63–159)
NONHDLC SERPL-MCNC: 164 MG/DL
POTASSIUM SERPL-SCNC: 3.8 MMOL/L (ref 3.5–5.1)
PROT SERPL-MCNC: 6.9 G/DL (ref 6–8.4)
SODIUM SERPL-SCNC: 139 MMOL/L (ref 136–145)
TRIGL SERPL-MCNC: 108 MG/DL (ref 30–150)

## 2021-09-07 PROCEDURE — 36415 COLL VENOUS BLD VENIPUNCTURE: CPT | Performed by: INTERNAL MEDICINE

## 2021-09-07 PROCEDURE — 80053 COMPREHEN METABOLIC PANEL: CPT | Performed by: INTERNAL MEDICINE

## 2021-09-07 PROCEDURE — 80061 LIPID PANEL: CPT | Performed by: INTERNAL MEDICINE

## 2021-11-02 ENCOUNTER — OFFICE VISIT (OUTPATIENT)
Dept: INTERNAL MEDICINE | Facility: CLINIC | Age: 56
End: 2021-11-02
Payer: COMMERCIAL

## 2021-11-02 DIAGNOSIS — N30.00 ACUTE CYSTITIS WITHOUT HEMATURIA: Primary | ICD-10-CM

## 2021-11-02 PROCEDURE — 99213 OFFICE O/P EST LOW 20 MIN: CPT | Mod: 95,,, | Performed by: FAMILY MEDICINE

## 2021-11-02 PROCEDURE — 99213 PR OFFICE/OUTPT VISIT, EST, LEVL III, 20-29 MIN: ICD-10-PCS | Mod: 95,,, | Performed by: FAMILY MEDICINE

## 2021-11-02 RX ORDER — NITROFURANTOIN 25; 75 MG/1; MG/1
100 CAPSULE ORAL 2 TIMES DAILY
Qty: 10 CAPSULE | Refills: 0 | Status: SHIPPED | OUTPATIENT
Start: 2021-11-02 | End: 2022-03-29

## 2021-11-17 DIAGNOSIS — Z12.31 OTHER SCREENING MAMMOGRAM: ICD-10-CM

## 2021-11-30 ENCOUNTER — HOSPITAL ENCOUNTER (OUTPATIENT)
Dept: RADIOLOGY | Facility: HOSPITAL | Age: 56
Discharge: HOME | End: 2021-11-30
Attending: INTERNAL MEDICINE
Payer: COMMERCIAL

## 2021-11-30 VITALS — WEIGHT: 165.38 LBS | BODY MASS INDEX: 29.3 KG/M2 | HEIGHT: 63 IN

## 2021-11-30 DIAGNOSIS — Z12.31 OTHER SCREENING MAMMOGRAM: ICD-10-CM

## 2021-11-30 PROCEDURE — 77063 MAMMO DIGITAL SCREENING BILAT WITH TOMO: ICD-10-PCS | Mod: 26,,, | Performed by: RADIOLOGY

## 2021-11-30 PROCEDURE — 77067 SCR MAMMO BI INCL CAD: CPT | Mod: TC

## 2021-11-30 PROCEDURE — 77067 SCR MAMMO BI INCL CAD: CPT | Mod: 26,,, | Performed by: RADIOLOGY

## 2021-11-30 PROCEDURE — 77067 MAMMO DIGITAL SCREENING BILAT WITH TOMO: ICD-10-PCS | Mod: 26,,, | Performed by: RADIOLOGY

## 2021-11-30 PROCEDURE — 77063 BREAST TOMOSYNTHESIS BI: CPT | Mod: 26,,, | Performed by: RADIOLOGY

## 2021-12-30 ENCOUNTER — LAB VISIT (OUTPATIENT)
Dept: PRIMARY CARE CLINIC | Facility: OTHER | Age: 56
End: 2021-12-30
Attending: INTERNAL MEDICINE
Payer: COMMERCIAL

## 2021-12-30 DIAGNOSIS — R06.02 SHORTNESS OF BREATH: ICD-10-CM

## 2021-12-30 DIAGNOSIS — Z20.822 ENCOUNTER FOR LABORATORY TESTING FOR COVID-19 VIRUS: ICD-10-CM

## 2021-12-30 DIAGNOSIS — R50.9 FEVER: ICD-10-CM

## 2021-12-30 DIAGNOSIS — R05.9 COUGH: ICD-10-CM

## 2021-12-30 PROCEDURE — U0003 INFECTIOUS AGENT DETECTION BY NUCLEIC ACID (DNA OR RNA); SEVERE ACUTE RESPIRATORY SYNDROME CORONAVIRUS 2 (SARS-COV-2) (CORONAVIRUS DISEASE [COVID-19]), AMPLIFIED PROBE TECHNIQUE, MAKING USE OF HIGH THROUGHPUT TECHNOLOGIES AS DESCRIBED BY CMS-2020-01-R: HCPCS | Performed by: INTERNAL MEDICINE

## 2022-01-01 ENCOUNTER — PATIENT MESSAGE (OUTPATIENT)
Dept: INTERNAL MEDICINE | Facility: CLINIC | Age: 57
End: 2022-01-01
Payer: COMMERCIAL

## 2022-01-01 LAB
SARS-COV-2 RNA RESP QL NAA+PROBE: DETECTED
SARS-COV-2- CYCLE NUMBER: 31

## 2022-01-02 ENCOUNTER — PATIENT MESSAGE (OUTPATIENT)
Dept: ADMINISTRATIVE | Facility: OTHER | Age: 57
End: 2022-01-02
Payer: COMMERCIAL

## 2022-01-03 ENCOUNTER — TELEPHONE (OUTPATIENT)
Dept: INTERNAL MEDICINE | Facility: CLINIC | Age: 57
End: 2022-01-03
Payer: COMMERCIAL

## 2022-01-03 NOTE — TELEPHONE ENCOUNTER
----- Message from Radha Saleh sent at 1/3/2022  2:03 PM CST -----  Regarding: covid positive/return to work  Contact: pt  Pt states she tested positive on 1/1.  Her employer is requesting her to come back to work in 5 days.  Asking what the dr says.  [phone]

## 2022-03-02 ENCOUNTER — PATIENT MESSAGE (OUTPATIENT)
Dept: RESEARCH | Facility: HOSPITAL | Age: 57
End: 2022-03-02
Payer: COMMERCIAL

## 2022-03-09 ENCOUNTER — LAB VISIT (OUTPATIENT)
Dept: LAB | Facility: HOSPITAL | Age: 57
End: 2022-03-09
Payer: COMMERCIAL

## 2022-03-09 DIAGNOSIS — E78.5 HYPERLIPIDEMIA LDL GOAL <160: ICD-10-CM

## 2022-03-09 LAB
ALBUMIN SERPL BCP-MCNC: 3.7 G/DL (ref 3.5–5.2)
ALP SERPL-CCNC: 66 U/L (ref 55–135)
ALT SERPL W/O P-5'-P-CCNC: 14 U/L (ref 10–44)
ANION GAP SERPL CALC-SCNC: 10 MMOL/L (ref 8–16)
AST SERPL-CCNC: 21 U/L (ref 10–40)
BILIRUB SERPL-MCNC: 0.4 MG/DL (ref 0.1–1)
BUN SERPL-MCNC: 11 MG/DL (ref 6–20)
CALCIUM SERPL-MCNC: 9.6 MG/DL (ref 8.7–10.5)
CHLORIDE SERPL-SCNC: 107 MMOL/L (ref 95–110)
CHOLEST SERPL-MCNC: 180 MG/DL (ref 120–199)
CHOLEST/HDLC SERPL: 2.8 {RATIO} (ref 2–5)
CO2 SERPL-SCNC: 28 MMOL/L (ref 23–29)
CREAT SERPL-MCNC: 0.9 MG/DL (ref 0.5–1.4)
EST. GFR  (AFRICAN AMERICAN): >60 ML/MIN/1.73 M^2
EST. GFR  (NON AFRICAN AMERICAN): >60 ML/MIN/1.73 M^2
GLUCOSE SERPL-MCNC: 88 MG/DL (ref 70–110)
HDLC SERPL-MCNC: 65 MG/DL (ref 40–75)
HDLC SERPL: 36.1 % (ref 20–50)
LDLC SERPL CALC-MCNC: 103.8 MG/DL (ref 63–159)
NONHDLC SERPL-MCNC: 115 MG/DL
POTASSIUM SERPL-SCNC: 3.9 MMOL/L (ref 3.5–5.1)
PROT SERPL-MCNC: 6.4 G/DL (ref 6–8.4)
SODIUM SERPL-SCNC: 145 MMOL/L (ref 136–145)
TRIGL SERPL-MCNC: 56 MG/DL (ref 30–150)

## 2022-03-09 PROCEDURE — 80061 LIPID PANEL: CPT | Performed by: INTERNAL MEDICINE

## 2022-03-09 PROCEDURE — 36415 COLL VENOUS BLD VENIPUNCTURE: CPT | Performed by: INTERNAL MEDICINE

## 2022-03-09 PROCEDURE — 80053 COMPREHEN METABOLIC PANEL: CPT | Performed by: INTERNAL MEDICINE

## 2022-03-28 ENCOUNTER — PATIENT OUTREACH (OUTPATIENT)
Dept: ADMINISTRATIVE | Facility: OTHER | Age: 57
End: 2022-03-28
Payer: COMMERCIAL

## 2022-03-29 ENCOUNTER — OFFICE VISIT (OUTPATIENT)
Dept: INTERNAL MEDICINE | Facility: CLINIC | Age: 57
End: 2022-03-29
Payer: COMMERCIAL

## 2022-03-29 ENCOUNTER — OFFICE VISIT (OUTPATIENT)
Dept: OBSTETRICS AND GYNECOLOGY | Facility: CLINIC | Age: 57
End: 2022-03-29
Payer: COMMERCIAL

## 2022-03-29 VITALS
WEIGHT: 169.06 LBS | BODY MASS INDEX: 29.95 KG/M2 | TEMPERATURE: 97 F | HEART RATE: 85 BPM | SYSTOLIC BLOOD PRESSURE: 128 MMHG | RESPIRATION RATE: 18 BRPM | DIASTOLIC BLOOD PRESSURE: 80 MMHG | HEIGHT: 63 IN | OXYGEN SATURATION: 97 %

## 2022-03-29 VITALS — DIASTOLIC BLOOD PRESSURE: 64 MMHG | BODY MASS INDEX: 30.3 KG/M2 | SYSTOLIC BLOOD PRESSURE: 130 MMHG | WEIGHT: 171.06 LBS

## 2022-03-29 DIAGNOSIS — Z01.419 ENCOUNTER FOR GYNECOLOGICAL EXAMINATION (GENERAL) (ROUTINE) WITHOUT ABNORMAL FINDINGS: Primary | ICD-10-CM

## 2022-03-29 DIAGNOSIS — Z00.00 ANNUAL PHYSICAL EXAM: Primary | ICD-10-CM

## 2022-03-29 DIAGNOSIS — Z12.4 SCREENING FOR CERVICAL CANCER: ICD-10-CM

## 2022-03-29 DIAGNOSIS — N89.8 VAGINAL DISCHARGE: ICD-10-CM

## 2022-03-29 DIAGNOSIS — N95.1 MENOPAUSAL SYNDROME (HOT FLASHES): ICD-10-CM

## 2022-03-29 DIAGNOSIS — Z12.31 SCREENING MAMMOGRAM, ENCOUNTER FOR: ICD-10-CM

## 2022-03-29 DIAGNOSIS — M25.511 CHRONIC RIGHT SHOULDER PAIN: ICD-10-CM

## 2022-03-29 DIAGNOSIS — E78.5 HYPERLIPIDEMIA LDL GOAL <160: ICD-10-CM

## 2022-03-29 DIAGNOSIS — G89.29 CHRONIC RIGHT SHOULDER PAIN: ICD-10-CM

## 2022-03-29 PROCEDURE — 99999 PR PBB SHADOW E&M-EST. PATIENT-LVL IV: ICD-10-PCS | Mod: PBBFAC,,, | Performed by: OBSTETRICS & GYNECOLOGY

## 2022-03-29 PROCEDURE — 88175 CYTOPATH C/V AUTO FLUID REDO: CPT | Performed by: OBSTETRICS & GYNECOLOGY

## 2022-03-29 PROCEDURE — 99999 PR PBB SHADOW E&M-EST. PATIENT-LVL IV: ICD-10-PCS | Mod: PBBFAC,,, | Performed by: INTERNAL MEDICINE

## 2022-03-29 PROCEDURE — 99396 PREV VISIT EST AGE 40-64: CPT | Mod: S$GLB,,, | Performed by: OBSTETRICS & GYNECOLOGY

## 2022-03-29 PROCEDURE — 87624 HPV HI-RISK TYP POOLED RSLT: CPT | Performed by: OBSTETRICS & GYNECOLOGY

## 2022-03-29 PROCEDURE — 99396 PR PREVENTIVE VISIT,EST,40-64: ICD-10-PCS | Mod: S$GLB,,, | Performed by: OBSTETRICS & GYNECOLOGY

## 2022-03-29 PROCEDURE — 99999 PR PBB SHADOW E&M-EST. PATIENT-LVL IV: CPT | Mod: PBBFAC,,, | Performed by: OBSTETRICS & GYNECOLOGY

## 2022-03-29 PROCEDURE — 99396 PREV VISIT EST AGE 40-64: CPT | Mod: S$GLB,,, | Performed by: INTERNAL MEDICINE

## 2022-03-29 PROCEDURE — 99396 PR PREVENTIVE VISIT,EST,40-64: ICD-10-PCS | Mod: S$GLB,,, | Performed by: INTERNAL MEDICINE

## 2022-03-29 PROCEDURE — 99999 PR PBB SHADOW E&M-EST. PATIENT-LVL IV: CPT | Mod: PBBFAC,,, | Performed by: INTERNAL MEDICINE

## 2022-03-29 PROCEDURE — 87481 CANDIDA DNA AMP PROBE: CPT | Mod: 59 | Performed by: OBSTETRICS & GYNECOLOGY

## 2022-03-29 RX ORDER — ATORVASTATIN CALCIUM 10 MG/1
10 TABLET, FILM COATED ORAL DAILY
Qty: 90 TABLET | Refills: 1 | Status: SHIPPED | OUTPATIENT
Start: 2022-03-29

## 2022-03-29 RX ORDER — CLONIDINE HYDROCHLORIDE 0.1 MG/1
0.1 TABLET ORAL NIGHTLY
Qty: 90 TABLET | Refills: 1 | Status: SHIPPED | OUTPATIENT
Start: 2022-03-29

## 2022-03-29 RX ORDER — MELOXICAM 7.5 MG/1
7.5 TABLET ORAL DAILY
Qty: 30 TABLET | Refills: 3 | Status: SHIPPED | OUTPATIENT
Start: 2022-03-29

## 2022-03-29 NOTE — PROGRESS NOTES
Subjective:       Patient ID: Yessenia Glover is a 57 y.o. female.    Chief Complaint:  Well Woman and Annual Exam      History of Present Illness  HPI  Annual Exam-Postmenopausal  Patient presents for annual exam. The patient has no complaints today.--self treated with monistat 3, wants discharge checked.   The patient is sexually active--denies pelvic pain,using lubricant; . GYN screening history: last pap: approximate date  and was normal and last mammogram: approximate date 2021 and was normal. The patient has never been taking hormone replacement therapy. Patient denies post-menopausal vaginal bleeding. The patient wears seatbelts: yes. The patient participates in regular exercise: yes. --walks 1x.wk; Has the patient ever been transfused or tattooed?: no. The patient reports that there is not domestic violence in her life.  Tolerable hot flushes  Denies urnary leakae  Using clonidine for sleep prn    Colonoscopy current;   GYN & OB History  Patient's last menstrual period was 2018.   Date of Last Pap: No result found    OB History    Para Term  AB Living   2 2 2     2   SAB IAB Ectopic Multiple Live Births           2      # Outcome Date GA Lbr Kevin/2nd Weight Sex Delivery Anes PTL Lv   2 Term      CS-LTranv   ELVA   1 Term      CS-LTranv   ELVA       Review of Systems  Review of Systems   Genitourinary: Positive for vaginal discharge.   All other systems reviewed and are negative.          Objective:      Physical Exam:   Constitutional: She is oriented to person, place, and time. She appears well-developed and well-nourished.     Eyes: Pupils are equal, round, and reactive to light. Conjunctivae and EOM are normal.      Pulmonary/Chest: Effort normal. Right breast exhibits no mass, no nipple discharge, no skin change and no tenderness. Left breast exhibits no mass, no nipple discharge, no skin change and no tenderness. Breasts are symmetrical.        Abdominal: Soft.      Genitourinary:    Vagina, uterus, right adnexa, left adnexa and rectum normal.      Pelvic exam was performed with patient supine.   The external female genitalia was normal.   Labial bartholins normal.Cervix is normal. Right adnexum displays no mass and no tenderness. Left adnexum displays no mass and no tenderness. No erythema,  no vaginal discharge (scant), bleeding, rectocele, cystocele or unspecified prolapse of vaginal walls in the vagina. Vagina was moist.Also,  pap results normal  (collected)  Uerus contour normal  Normal urethral meatus.Urethra findings: no urethral massBladder findings: no bladder distention and no bladder tenderness          Musculoskeletal: Normal range of motion and moves all extremeties.       Neurological: She is alert and oriented to person, place, and time.    Skin: Skin is warm.    Psychiatric: She has a normal mood and affect. Her behavior is normal.           Assessment:     Encounter Diagnoses   Name Primary?    Encounter for gynecological examination (general) (routine) without abnormal findings Yes    Screening for cervical cancer     Screening mammogram, encounter for     Vaginal discharge              Plan:      Continue annual well woman exam.  Pap 2020 due in 2023; Reviewed updated recommendations for pap smears (every 3 years) in low risk patients.   Recommend annual pelvic exams.  Reviewed recommendations for annual CBE.  Pt prefers pap taken  Affirm today  Osteoporosis prevention; 1200mg calcium/d with source of vitamin d  Continue diet, exercise, weight loss

## 2022-03-29 NOTE — PROGRESS NOTES
Health Maintenance Due   Topic Date Due    Shingles Vaccine (1 of 2) Never done    Influenza Vaccine (1) 09/01/2021    COVID-19 Vaccine (3 - Booster for Moderna series) 09/15/2021     Updates were requested from care everywhere.  Chart was reviewed for overdue Proactive [facility] Encounters ([facility]) topics (CRS, Breast Cancer Screening, Eye exam)  Health Maintenance has been updated.  LINKS immunization registry triggered.  Immunizations were reconciled.

## 2022-03-29 NOTE — PROGRESS NOTES
Yessenia TONG Glover  57 y.o. Black or  female  [id number]    Chief Complaint:  Chief Complaint   Patient presents with    Annual Exam       History of Present Illness:  Presents for an annual exam.   HLD--improved. She takes atorvastatin a couple of times per week.   Lab Results   Component Value Date    LDLCALC 103.8 2022     She needs a refill on clonidine. She takes it for menopausal symptoms as needed.   She has chronic right shoulder pain. She was on meloxicam and needs refills.     Laboratory  Lab Results   Component Value Date    WBC 7.39 2019    HGB 13.1 2019    HCT 40.5 2019     2019    CHOL 180 2022    TRIG 56 2022    HDL 65 2022    ALT 14 2022    AST 21 2022     2022    K 3.9 2022     2022    CREATININE 0.9 2022    BUN 11 2022    CO2 28 2022    TSH 2.400 2019     Review of Systems   Constitutional: Negative for fever and weight loss.   Respiratory: Negative for cough and shortness of breath.    Cardiovascular: Negative for chest pain and leg swelling.   Gastrointestinal: Negative for abdominal pain.   Genitourinary: Negative for dysuria.   Musculoskeletal: Positive for joint pain.   Neurological: Negative for dizziness and headaches.   Psychiatric/Behavioral: The patient has insomnia (occasionally due to hot flashes).        The following were reviewed: Active problem list, medication list, allergies, family history, social history, and Health Maintenance.     History:  Past Medical History:   Diagnosis Date    Hyperlipidemia     Hypertension      Past Surgical History:   Procedure Laterality Date    AXILLARY SURGERY Right     Right     SECTION  1997     SECTION  1992    COLONOSCOPY N/A 2019    Procedure: COLONOSCOPY;  Surgeon: Grant Cristobal MD;  Location: [facility];  Service: Endoscopy;  Laterality: N/A;    THYROIDECTOMY, PARTIAL       TUBAL LIGATION       Family History   Problem Relation Age of Onset    Hypertension Mother     Hypertension Father      Social History     Socioeconomic History    Marital status:    Tobacco Use    Smoking status: Never Smoker    Smokeless tobacco: Never Used   Substance and Sexual Activity    Alcohol use: No    Drug use: No    Sexual activity: Yes     Partners: Male     Social Determinants of Health     Transportation Needs: Unknown    Lack of Transportation (Medical): Patient refused   Physical Activity: Unknown    Days of Exercise per Week: Patient refused   Social Connections: Unknown    Active Member of Clubs or Organizations: Patient refused    Marital Status: Patient refused   Housing Stability: Unknown    Unable to Pay for Housing in the Last Year: Patient refused     Patient Active Problem List   Diagnosis    Hyperlipidemia LDL goal <160    Chronic right shoulder pain     Review of patient's allergies indicates:   Allergen Reactions    Codeine Itching    Sulfa (sulfonamide antibiotics) Nausea And Vomiting       Health Maintenance  Health Maintenance Topics with due status: Not Due       Topic Last Completion Date    TETANUS VACCINE 02/26/2019    Colorectal Cancer Screening 04/16/2019    Cervical Cancer Screening 11/10/2020    Mammogram 11/30/2021    Lipid Panel 03/09/2022     Health Maintenance Due   Topic Date Due    Shingles Vaccine (1 of 2) Never done    COVID-19 Vaccine (3 - Booster for Moderna series) 09/15/2021       Medications:  Current Outpatient Medications on File Prior to Visit   Medication Sig Dispense Refill    ascorbic acid/multivit-min (EMERGEN-C ORAL) Take by mouth.      Ca-D3-mag-zinc--leti-boron 600 mg calcium- 800 unit-40 mg Chew       chlorhexidine (PERIDEX) 0.12 % solution DIP TOOTHBRUSH AND BRUSH TWICE DAILY      fish oil-omega-3 fatty acids 300-1,000 mg capsule Take by mouth once daily.      multivitamin-min-iron-FA-vit K (ADULTS MULTIVITAMIN) 18  mg iron-400 mcg-25 mcg Tab       [DISCONTINUED] amoxicillin (AMOXIL) 500 MG Tab Take 500 mg by mouth 3 (three) times daily.      [DISCONTINUED] atorvastatin (LIPITOR) 10 MG tablet Take 1 tablet (10 mg total) by mouth once daily. 90 tablet 1    [DISCONTINUED] BLACK COHOSH ORAL Take 40 mg by mouth. Pt takes this medication for Hot flashes once daily      [DISCONTINUED] ciclopirox (PENLAC) 8 % Soln Apply to affected toenails at night time DAILY. On 7th day, file nails down, clean all nails with alcohol and restart application process. 1 Bottle 10    [DISCONTINUED] fluconazole (DIFLUCAN) 200 MG Tab Take 1 tablet by mouth once daily 3 tablet 0    [DISCONTINUED] MULTIVIT-IRON-MIN-FOLIC ACID 3,500-18-0.4 UNIT-MG-MG ORAL CHEW Take by mouth once daily.      [DISCONTINUED] nitrofurantoin, macrocrystal-monohydrate, (MACROBID) 100 MG capsule Take 1 capsule (100 mg total) by mouth 2 (two) times daily. 10 capsule 0    [DISCONTINUED] triamcinolone acetonide 0.025% (KENALOG) 0.025 % cream Apply topically 2 (two) times daily. 15 g 0    estradioL (YUVAFEM) 10 mcg Tab Place 1 tablet (10 mcg total) vaginally every evening for 14 days, THEN 1 tablet (10 mcg total) twice a week for 14 days. 18 tablet 0    [DISCONTINUED] cloNIDine (CATAPRES) 0.1 MG tablet Take 1 tablet (0.1 mg total) by mouth every evening. 30 tablet 6     No current facility-administered medications on file prior to visit.       Medications have been reviewed and reconciled with patient at visit today.    Exam:  Vitals:    03/29/22 1003   BP: 128/80   Pulse: 85   Resp: 18   Temp: 96.5 °F (35.8 °C)     Weight: 76.7 kg (169 lb 1.5 oz)   Body mass index is 29.95 kg/m².      Physical Exam  Vitals reviewed.   Constitutional:       General: She is not in acute distress.     Appearance: She is well-developed. She is not ill-appearing.   Eyes:      General: No scleral icterus.  Cardiovascular:      Rate and Rhythm: Normal rate and regular rhythm.      Heart sounds:  Normal heart sounds.   Pulmonary:      Effort: Pulmonary effort is normal. No respiratory distress.      Breath sounds: Normal breath sounds. No wheezing or rales.   Abdominal:      General: Bowel sounds are normal.      Palpations: Abdomen is soft.   Musculoskeletal:      Right lower leg: No edema.      Left lower leg: No edema.   Skin:     General: Skin is warm and dry.   Neurological:      Mental Status: She is alert and oriented to person, place, and time.   Psychiatric:         Behavior: Behavior normal.       Assessment:  The primary encounter diagnosis was Annual physical exam. Diagnoses of Hyperlipidemia LDL goal <160, Menopausal syndrome (hot flashes), and Chronic right shoulder pain were also pertinent to this visit.    Plan:  Annual physical exam  -     Counseled regarding age appropriate screenings and immunizations  -     Counseled regarding lifestyle modifications    Hyperlipidemia LDL goal <160  -     Refill atorvastatin (LIPITOR) 10 MG tablet; Take 1 tablet (10 mg total) by mouth once daily.  Dispense: 90 tablet; Refill: 1    Menopausal syndrome (hot flashes)  -     Refill cloNIDine (CATAPRES) 0.1 MG tablet; Take 1 tablet (0.1 mg total) by mouth every evening.  Dispense: 90 tablet; Refill: 1    Chronic right shoulder pain  -     Refill meloxicam (MOBIC) 7.5 MG tablet; Take 1 tablet (7.5 mg total) by mouth once daily.  Dispense: 30 tablet; Refill: 3    Follow up in about 6 months (around 9/29/2022). If lipids remain stable, she can come in annually.

## 2022-09-17 ENCOUNTER — HOSPITAL ENCOUNTER (EMERGENCY)
Facility: HOSPITAL | Age: 57
Discharge: HOME | End: 2022-09-17
Attending: EMERGENCY MEDICINE
Payer: COMMERCIAL

## 2022-09-17 VITALS
TEMPERATURE: 99 F | WEIGHT: 178.56 LBS | SYSTOLIC BLOOD PRESSURE: 145 MMHG | HEIGHT: 63 IN | OXYGEN SATURATION: 99 % | HEART RATE: 70 BPM | DIASTOLIC BLOOD PRESSURE: 76 MMHG | BODY MASS INDEX: 31.64 KG/M2 | RESPIRATION RATE: 18 BRPM

## 2022-09-17 DIAGNOSIS — L03.113 CELLULITIS OF RIGHT UPPER EXTREMITY: Primary | ICD-10-CM

## 2022-09-17 PROCEDURE — 99283 EMERGENCY DEPT VISIT LOW MDM: CPT

## 2022-09-17 RX ORDER — CLINDAMYCIN HYDROCHLORIDE 150 MG/1
450 CAPSULE ORAL EVERY 8 HOURS
Qty: 90 CAPSULE | Refills: 0 | Status: SHIPPED | OUTPATIENT
Start: 2022-09-17 | End: 2022-09-27

## 2022-09-18 NOTE — ED PROVIDER NOTES
Encounter Date: 2022       History     Chief Complaint   Patient presents with    Insect Bite     NOTICED BITE X2 DAYS AGO ON RIGHT FA     Patient is a 57-year-old female who presents with Redness, warmth and swelling to the right elbow.  Onset was 2 days.  Patient states she feels like she was bit by an insect.  Patient denies any fever, nausea or vomiting.  Denies any drainage from the area.  No distress noted this time.    Review of patient's allergies indicates:   Allergen Reactions    Codeine Itching    Sulfa (sulfonamide antibiotics) Nausea And Vomiting     Past Medical History:   Diagnosis Date    Hyperlipidemia     Hypertension      Past Surgical History:   Procedure Laterality Date    AXILLARY SURGERY Right     Right     SECTION  1997     SECTION  1992    COLONOSCOPY N/A 2019    Procedure: COLONOSCOPY;  Surgeon: Grant Cristobal MD;  Location: [facility];  Service: Endoscopy;  Laterality: N/A;    THYROIDECTOMY, PARTIAL      TUBAL LIGATION       Family History   Problem Relation Age of Onset    Hypertension Mother     Hypertension Father      Social History     Tobacco Use    Smoking status: Never    Smokeless tobacco: Never   Substance Use Topics    Alcohol use: No    Drug use: No     Review of Systems   Constitutional:  Negative for fever.   HENT:  Negative for sore throat.    Respiratory:  Negative for shortness of breath.    Cardiovascular:  Negative for chest pain.   Gastrointestinal:  Negative for nausea.   Genitourinary:  Negative for dysuria.   Musculoskeletal:  Negative for back pain.   Skin:  Negative for rash.        Warmth, swelling and redness to the right elbow   Neurological:  Negative for weakness.   Hematological:  Does not bruise/bleed easily.     Physical Exam     Initial Vitals [22 2156]   BP Pulse Resp Temp SpO2   (!) 145/76 70 18 98.6 °F (37 °C) 99 %      MAP       --         Physical Exam    Nursing note and vitals reviewed.  Constitutional:  She appears well-developed and well-nourished.   HENT:   Head: Normocephalic and atraumatic.   Eyes: EOM are normal. Pupils are equal, round, and reactive to light.   Neck: Neck supple.   Normal range of motion.  Cardiovascular:  Normal rate, regular rhythm, normal heart sounds and intact distal pulses.           Pulmonary/Chest: Breath sounds normal.   Abdominal: Abdomen is soft. Bowel sounds are normal.   Musculoskeletal:         General: Normal range of motion.      Cervical back: Normal range of motion and neck supple.     Neurological: She is alert and oriented to person, place, and time. She has normal strength and normal reflexes.   Skin: Skin is warm and dry. There is erythema (erythema, warmth, tenderness and swelling noted to the right elbow that appears consistent with cellulitis.).       ED Course   Procedures  Labs Reviewed - No data to display       Imaging Results    None          Medications - No data to display  Medical Decision Making:   ED Management:  Patient instructed take antibiotics as prescribed and follow-up with primary care physician.  Patient return to the emergency room with any worsening symptoms including increased swelling after antibiotic use.                        Clinical Impression:   Final diagnoses:  [L03.113] Cellulitis of right upper extremity (Primary)      ED Disposition Condition    Discharge Stable          ED Prescriptions       Medication Sig Dispense Start Date End Date Auth. Provider    clindamycin (CLEOCIN) 150 MG capsule Take 3 capsules (450 mg total) by mouth every 8 (eight) hours. for 10 days 90 capsule 9/17/2022 9/27/2022 Navin Pederson NP          Follow-up Information       Follow up With Specialties Details Why Contact Kalia Suero, DO Internal Medicine  As needed [id number] [place] [id number]  [phone]               Navin Pederson NP  09/18/22 [id number]

## 2022-09-20 ENCOUNTER — LAB VISIT (OUTPATIENT)
Dept: LAB | Facility: HOSPITAL | Age: 57
End: 2022-09-20
Attending: INTERNAL MEDICINE
Payer: COMMERCIAL

## 2022-09-20 DIAGNOSIS — E78.5 HYPERLIPIDEMIA LDL GOAL <160: ICD-10-CM

## 2022-09-20 LAB
ALBUMIN SERPL BCP-MCNC: 4 G/DL (ref 3.5–5.2)
ALP SERPL-CCNC: 82 U/L (ref 55–135)
ALT SERPL W/O P-5'-P-CCNC: 20 U/L (ref 10–44)
ANION GAP SERPL CALC-SCNC: 7 MMOL/L (ref 8–16)
AST SERPL-CCNC: 26 U/L (ref 10–40)
BILIRUB SERPL-MCNC: 0.5 MG/DL (ref 0.1–1)
BUN SERPL-MCNC: 12 MG/DL (ref 6–20)
CALCIUM SERPL-MCNC: 9.5 MG/DL (ref 8.7–10.5)
CHLORIDE SERPL-SCNC: 107 MMOL/L (ref 95–110)
CHOLEST SERPL-MCNC: 236 MG/DL (ref 120–199)
CHOLEST/HDLC SERPL: 3.9 {RATIO} (ref 2–5)
CO2 SERPL-SCNC: 29 MMOL/L (ref 23–29)
CREAT SERPL-MCNC: 0.9 MG/DL (ref 0.5–1.4)
EST. GFR  (NO RACE VARIABLE): >60 ML/MIN/1.73 M^2
GLUCOSE SERPL-MCNC: 99 MG/DL (ref 70–110)
HDLC SERPL-MCNC: 61 MG/DL (ref 40–75)
HDLC SERPL: 25.8 % (ref 20–50)
LDLC SERPL CALC-MCNC: 155.4 MG/DL (ref 63–159)
NONHDLC SERPL-MCNC: 175 MG/DL
POTASSIUM SERPL-SCNC: 4.2 MMOL/L (ref 3.5–5.1)
PROT SERPL-MCNC: 6.7 G/DL (ref 6–8.4)
SODIUM SERPL-SCNC: 143 MMOL/L (ref 136–145)
TRIGL SERPL-MCNC: 98 MG/DL (ref 30–150)

## 2022-09-20 PROCEDURE — 80061 LIPID PANEL: CPT | Performed by: INTERNAL MEDICINE

## 2022-09-20 PROCEDURE — 36415 COLL VENOUS BLD VENIPUNCTURE: CPT | Performed by: INTERNAL MEDICINE

## 2022-09-20 PROCEDURE — 80053 COMPREHEN METABOLIC PANEL: CPT | Performed by: INTERNAL MEDICINE

## 2022-09-27 ENCOUNTER — OFFICE VISIT (OUTPATIENT)
Dept: INTERNAL MEDICINE | Facility: CLINIC | Age: 57
End: 2022-09-27
Payer: COMMERCIAL

## 2022-09-27 VITALS
RESPIRATION RATE: 17 BRPM | HEIGHT: 63 IN | BODY MASS INDEX: 31.41 KG/M2 | HEART RATE: 79 BPM | WEIGHT: 177.25 LBS | TEMPERATURE: 97 F | DIASTOLIC BLOOD PRESSURE: 84 MMHG | OXYGEN SATURATION: 98 % | SYSTOLIC BLOOD PRESSURE: 130 MMHG

## 2022-09-27 DIAGNOSIS — T36.95XA ANTIBIOTIC-INDUCED YEAST INFECTION: ICD-10-CM

## 2022-09-27 DIAGNOSIS — B37.31 YEAST VAGINITIS: ICD-10-CM

## 2022-09-27 DIAGNOSIS — B37.9 ANTIBIOTIC-INDUCED YEAST INFECTION: ICD-10-CM

## 2022-09-27 DIAGNOSIS — I10 HYPERTENSION, UNSPECIFIED TYPE: ICD-10-CM

## 2022-09-27 DIAGNOSIS — E78.5 HYPERLIPIDEMIA LDL GOAL <160: Primary | ICD-10-CM

## 2022-09-27 PROCEDURE — 99214 PR OFFICE/OUTPT VISIT, EST, LEVL IV, 30-39 MIN: ICD-10-PCS | Mod: S$GLB,,, | Performed by: PHYSICIAN ASSISTANT

## 2022-09-27 PROCEDURE — 99214 OFFICE O/P EST MOD 30 MIN: CPT | Mod: S$GLB,,, | Performed by: PHYSICIAN ASSISTANT

## 2022-09-27 PROCEDURE — 99999 PR PBB SHADOW E&M-EST. PATIENT-LVL V: ICD-10-PCS | Mod: PBBFAC,,, | Performed by: PHYSICIAN ASSISTANT

## 2022-09-27 PROCEDURE — 99999 PR PBB SHADOW E&M-EST. PATIENT-LVL V: CPT | Mod: PBBFAC,,, | Performed by: PHYSICIAN ASSISTANT

## 2022-09-27 RX ORDER — FLUCONAZOLE 150 MG/1
150 TABLET ORAL ONCE
Qty: 2 TABLET | Refills: 0 | Status: SHIPPED | OUTPATIENT
Start: 2022-09-27 | End: 2022-09-27

## 2022-09-27 NOTE — PROGRESS NOTES
"Subjective:      Patient ID: Yessenia Glover is a 57 y.o. female.    Chief Complaint: Follow-up    Patient is new to me, being seen today for 6mth f/u.     HLD- on statin therapy, omega 3   Does not take cholesterol medication daily, drinking smoothies more often  HTN- not on meds, does not have home cuff     Labs recently completed, cholesterol up from previous     Recently treated with Clinda x10 days for cellulitis to R upper arm, area has healed but left with yeast infection    Last visit March 2022    Review of Systems   Constitutional:  Negative for chills, diaphoresis and fever.   HENT:  Negative for congestion, rhinorrhea and sore throat.    Respiratory:  Negative for cough, shortness of breath and wheezing.    Cardiovascular:  Positive for leg swelling (end of the day, improves with rest).   Gastrointestinal:  Negative for abdominal pain, constipation, diarrhea, nausea and vomiting.   Skin:  Negative for rash.   Neurological:  Negative for dizziness, light-headedness and headaches.     Objective:   /84   Pulse 79   Temp 97.1 °F (36.2 °C)   Resp 17   Ht 5' 3" (1.6 m)   Wt 80.4 kg (177 lb 4 oz)   LMP 11/14/2018   SpO2 98%   BMI 31.40 kg/m²   Physical Exam  Constitutional:       General: She is not in acute distress.     Appearance: Normal appearance. She is well-developed. She is not ill-appearing.   HENT:      Head: Normocephalic and atraumatic.   Cardiovascular:      Rate and Rhythm: Normal rate and regular rhythm.      Heart sounds: Normal heart sounds. No murmur heard.  Pulmonary:      Effort: Pulmonary effort is normal. No respiratory distress.      Breath sounds: Normal breath sounds. No decreased breath sounds.   Musculoskeletal:      Right lower leg: No edema.      Left lower leg: No edema.   Skin:     General: Skin is warm and dry.      Findings: No rash.   Psychiatric:         Speech: Speech normal.         Behavior: Behavior normal.         Thought Content: Thought content normal. "     Assessment:      1. Hyperlipidemia LDL goal <160    2. Hypertension, unspecified type    3. Yeast vaginitis    4. Antibiotic-induced yeast infection       Plan:   Hyperlipidemia LDL goal <160   Discussed lifestyle modifications     Hypertension, unspecified type  -     CBC Auto Differential; Future; Expected date: 09/27/2022    Yeast vaginitis    Antibiotic-induced yeast infection  -     fluconazole (DIFLUCAN) 150 MG Tab; Take 1 tablet (150 mg total) by mouth once. Repeat in 3 days if symptoms still persist. for 1 dose  Dispense: 2 tablet; Refill: 0      Declines flu shot     Start statin daily  Recommend low fat, high fiber diet with heart healthy exercise    Discussed worsening signs/symptoms and when to return to clinic or go to ED.   Patient expresses understanding and agrees with treatment plan.     6mth f/u PCP with labs prior

## 2022-10-02 ENCOUNTER — PATIENT MESSAGE (OUTPATIENT)
Dept: OBSTETRICS AND GYNECOLOGY | Facility: CLINIC | Age: 57
End: 2022-10-02
Payer: COMMERCIAL

## 2022-10-02 ENCOUNTER — NURSE TRIAGE (OUTPATIENT)
Dept: ADMINISTRATIVE | Facility: CLINIC | Age: 57
End: 2022-10-02
Payer: COMMERCIAL

## 2022-10-02 NOTE — TELEPHONE ENCOUNTER
"Pt. Reports having leakage of clear-white colored discharge to bilateral breast she noticed on Friday, reports that  she also notices tingling to breast. Pt advised to be seen by provider within 2 weeks. States Dr. Barba does not have any appointment available until November. ED/[facility], and RR offered.    Reason for Disposition   [1] Nipple discharge AND [2] not bloody (e.g., clear, white, yellow, brown, green)    Additional Information   Negative: [1] SEVERE breast pain AND [2] fever > 103 F (39.4 C)   Negative: Patient sounds very sick or weak to the triager   Negative: [1] Breast looks infected (spreading redness, feels hot or painful to touch) AND [2] fever   Negative: [1] Breast looks infected (spreading redness, feels hot or painful to touch) AND [2] no fever   Negative: [1] Painful rash AND [2] multiple small blisters grouped together (i.e., dermatomal distribution or "band" or "stripe")   Negative: [1] Cuts, burns, or bruises of breasts AND [2] suspicious history for the injury   Negative: Breast lump   Negative: [1] Nipple discharge AND [2] bloody   Negative: Nipple is inverted (i.e., points inward)  (Exception: long-term physical characteristic, present for many years)   Negative: Dry flaking-peeling skin of nipple   Negative: Change in shape or appearance of breast   Negative: Dimpling of skin of breast (i.e., skin looks like the outside of an orange peel)   Negative: [1] Breast tenderness and fullness AND [2] pregnant    Protocols used: Breast Symptoms-A-AH    "

## 2022-10-03 ENCOUNTER — OFFICE VISIT (OUTPATIENT)
Dept: OBSTETRICS AND GYNECOLOGY | Facility: CLINIC | Age: 57
End: 2022-10-03
Payer: COMMERCIAL

## 2022-10-03 ENCOUNTER — LAB VISIT (OUTPATIENT)
Dept: LAB | Facility: HOSPITAL | Age: 57
End: 2022-10-03
Attending: OBSTETRICS & GYNECOLOGY
Payer: COMMERCIAL

## 2022-10-03 VITALS
BODY MASS INDEX: 31.56 KG/M2 | DIASTOLIC BLOOD PRESSURE: 78 MMHG | WEIGHT: 178.13 LBS | HEIGHT: 63 IN | SYSTOLIC BLOOD PRESSURE: 116 MMHG

## 2022-10-03 DIAGNOSIS — N64.52 NIPPLE DISCHARGE IN FEMALE: ICD-10-CM

## 2022-10-03 DIAGNOSIS — Z12.39 ENCOUNTER FOR SCREENING FOR MALIGNANT NEOPLASM OF BREAST, UNSPECIFIED SCREENING MODALITY: ICD-10-CM

## 2022-10-03 DIAGNOSIS — N64.52 NIPPLE DISCHARGE IN FEMALE: Primary | ICD-10-CM

## 2022-10-03 LAB
PROLACTIN SERPL IA-MCNC: 11.7 NG/ML (ref 5.2–26.5)
TSH SERPL DL<=0.005 MIU/L-ACNC: 1.63 UIU/ML (ref 0.4–4)

## 2022-10-03 PROCEDURE — 99213 OFFICE O/P EST LOW 20 MIN: CPT | Mod: S$GLB,,, | Performed by: OBSTETRICS & GYNECOLOGY

## 2022-10-03 PROCEDURE — 36415 COLL VENOUS BLD VENIPUNCTURE: CPT | Mod: PO | Performed by: OBSTETRICS & GYNECOLOGY

## 2022-10-03 PROCEDURE — 99999 PR PBB SHADOW E&M-EST. PATIENT-LVL III: CPT | Mod: PBBFAC,,, | Performed by: OBSTETRICS & GYNECOLOGY

## 2022-10-03 PROCEDURE — 84146 ASSAY OF PROLACTIN: CPT | Performed by: OBSTETRICS & GYNECOLOGY

## 2022-10-03 PROCEDURE — 84443 ASSAY THYROID STIM HORMONE: CPT | Performed by: OBSTETRICS & GYNECOLOGY

## 2022-10-03 PROCEDURE — 99999 PR PBB SHADOW E&M-EST. PATIENT-LVL III: ICD-10-PCS | Mod: PBBFAC,,, | Performed by: OBSTETRICS & GYNECOLOGY

## 2022-10-03 PROCEDURE — 99213 PR OFFICE/OUTPT VISIT, EST, LEVL III, 20-29 MIN: ICD-10-PCS | Mod: S$GLB,,, | Performed by: OBSTETRICS & GYNECOLOGY

## 2022-10-03 RX ORDER — FLUCONAZOLE 150 MG/1
TABLET ORAL
COMMUNITY
Start: 2022-09-27 | End: 2022-10-03 | Stop reason: ALTCHOICE

## 2022-10-03 NOTE — PROGRESS NOTES
Subjective:       Patient ID: Yessenia Glover is a 57 y.o. female.    Chief Complaint:  No chief complaint on file.      History of Present Illness  HPI  Pt reports noticing biat nipple discharge stains in bra on friday  Reports was wearing a bra that was not as supportive    Has not seen nipple discharge since    Reports no change in medication    GYN & OB History  Patient's last menstrual period was 2018.   Date of Last Pap: No result found    OB History    Para Term  AB Living   2 2 2     2   SAB IAB Ectopic Multiple Live Births           2      # Outcome Date GA Lbr Kevin/2nd Weight Sex Delivery Anes PTL Lv   2 Term      CS-LTranv   ELVA   1 Term      CS-LTranv   ELVA       Review of Systems  Review of Systems   Integumentary:  Positive for nipple discharge.   All other systems reviewed and are negative.  Breast: Positive for nipple discharge.        Objective:      Physical Exam:        Pulmonary/Chest: Right breast exhibits no mass, no skin change, no tenderness, presence and no swelling. Left breast exhibits no mass, no nipple discharge, no skin change, no tenderness, presence and no swelling. Breasts are symmetrical.                              Assessment:        1. Nipple discharge in female               Plan:      Tsh , prolactin today  Advised tight supportive bra  Mammo scheduled

## 2022-12-02 ENCOUNTER — OFFICE VISIT (OUTPATIENT)
Dept: URGENT CARE | Facility: CLINIC | Age: 57
End: 2022-12-02
Payer: COMMERCIAL

## 2022-12-02 VITALS
WEIGHT: 170 LBS | RESPIRATION RATE: 18 BRPM | TEMPERATURE: 99 F | OXYGEN SATURATION: 100 % | SYSTOLIC BLOOD PRESSURE: 155 MMHG | HEART RATE: 91 BPM | HEIGHT: 63 IN | DIASTOLIC BLOOD PRESSURE: 80 MMHG | BODY MASS INDEX: 30.12 KG/M2

## 2022-12-02 DIAGNOSIS — J06.9 VIRAL UPPER RESPIRATORY TRACT INFECTION WITH COUGH: Primary | ICD-10-CM

## 2022-12-02 DIAGNOSIS — J04.0 LARYNGITIS: ICD-10-CM

## 2022-12-02 DIAGNOSIS — I10 HYPERTENSION, UNSPECIFIED TYPE: ICD-10-CM

## 2022-12-02 LAB
CTP QC/QA: YES
SARS-COV-2 RDRP RESP QL NAA+PROBE: NEGATIVE

## 2022-12-02 PROCEDURE — 99214 PR OFFICE/OUTPT VISIT, EST, LEVL IV, 30-39 MIN: ICD-10-PCS | Mod: S$GLB,,, | Performed by: NURSE PRACTITIONER

## 2022-12-02 PROCEDURE — 87635: ICD-10-PCS | Mod: QW,S$GLB,, | Performed by: NURSE PRACTITIONER

## 2022-12-02 PROCEDURE — 87635 SARS-COV-2 COVID-19 AMP PRB: CPT | Mod: QW,S$GLB,, | Performed by: NURSE PRACTITIONER

## 2022-12-02 PROCEDURE — 99214 OFFICE O/P EST MOD 30 MIN: CPT | Mod: S$GLB,,, | Performed by: NURSE PRACTITIONER

## 2022-12-02 RX ORDER — METHYLPREDNISOLONE 4 MG/1
TABLET ORAL
Qty: 21 EACH | Refills: 0 | Status: SHIPPED | OUTPATIENT
Start: 2022-12-02 | End: 2022-12-23

## 2022-12-02 RX ORDER — PROMETHAZINE HYDROCHLORIDE AND DEXTROMETHORPHAN HYDROBROMIDE 6.25; 15 MG/5ML; MG/5ML
5 SYRUP ORAL EVERY 4 HOURS PRN
Qty: 118 ML | Refills: 0 | Status: SHIPPED | OUTPATIENT
Start: 2022-12-02 | End: 2022-12-12

## 2022-12-02 RX ORDER — FLUTICASONE PROPIONATE 50 MCG
2 SPRAY, SUSPENSION (ML) NASAL DAILY
Qty: 16 G | Refills: 0 | Status: SHIPPED | OUTPATIENT
Start: 2022-12-02 | End: 2023-01-01

## 2022-12-02 NOTE — PROGRESS NOTES
"Subjective:       Patient ID: Yessenia Glover is a 57 y.o. female.    Vitals:  height is 5' 3" (1.6 m) and weight is 77.1 kg (170 lb). Her oral temperature is 99 °F (37.2 °C). Her blood pressure is 155/80 (abnormal) and her pulse is 91. Her respiration is 18 and oxygen saturation is 100%.     Chief Complaint: Headache (3 days)    Yessenia Glover is a 57 year old female whom presents to the clinic with complaints of "flu-like symptoms" for 4 days. She reports headache for 3 days, body aches, productive cough,congestion,  post nasal drip, sore throat, hoarseness, chills and sweats. Pt has been taking Theraflu, sudafed, AllegraD and Mucinex  with minimal relief. She denies any known sick contacts.     Headache   This is a new problem. The current episode started in the past 7 days (3 days). The problem occurs intermittently. The problem has been unchanged. The pain is located in the Frontal region. The pain does not radiate. The quality of the pain is described as pulsating, aching and throbbing. The pain is at a severity of 4/10. The pain is moderate. Associated symptoms include coughing, ear pain, rhinorrhea and a sore throat. Pertinent negatives include no dizziness or swollen glands. The symptoms are aggravated by unknown. She has tried acetaminophen and NSAIDs for the symptoms. The treatment provided mild relief. Her past medical history is significant for hypertension. There is no history of migraine headaches or recent head traumas.     HENT:  Positive for ear pain and sore throat.    Respiratory:  Positive for cough.    Neurological:  Positive for headaches. Negative for dizziness and history of migraines.     Objective:      Physical Exam   Constitutional: She is oriented to person, place, and time. She appears well-developed. She is cooperative.  Non-toxic appearance. She appears ill. No distress.   HENT:   Head: Normocephalic and atraumatic.   Ears:   Right Ear: Hearing, external ear and ear canal " normal. A middle ear effusion is present.   Left Ear: Hearing, tympanic membrane, external ear and ear canal normal.   Nose: Purulent discharge present. No mucosal edema, rhinorrhea or nasal deformity. No epistaxis. Right sinus exhibits maxillary sinus tenderness and frontal sinus tenderness. Left sinus exhibits maxillary sinus tenderness and frontal sinus tenderness.   Mouth/Throat: Uvula is midline and mucous membranes are normal. Mucous membranes are not dry. No trismus in the jaw. Normal dentition. No uvula swelling. Posterior oropharyngeal erythema present. No oropharyngeal exudate or posterior oropharyngeal edema.      Comments: Positive for post nasal drip  Eyes: Conjunctivae and lids are normal. No scleral icterus.   Neck: Trachea normal and phonation normal. Neck supple. No edema present. No erythema present. No neck rigidity present.   Cardiovascular: Normal rate, regular rhythm and normal heart sounds.   Pulmonary/Chest: Effort normal and breath sounds normal. No respiratory distress. She has no decreased breath sounds. She has no rhonchi.   Abdominal: Normal appearance.   Musculoskeletal: Normal range of motion.         General: No deformity. Normal range of motion.   Neurological: She is alert and oriented to person, place, and time. She exhibits normal muscle tone. Coordination normal.   Skin: Skin is warm, dry, intact, not diaphoretic and not pale.   Psychiatric: Her speech is normal and behavior is normal. Judgment and thought content normal.   Nursing note and vitals reviewed.  Results for orders placed or performed in visit on 12/02/22   POCT COVID-19 Rapid Screening   Result Value Ref Range    POC Rapid COVID Negative Negative     Acceptable Yes            Assessment:       1. Viral upper respiratory tract infection with cough    2. Laryngitis    3. Hypertension, unspecified type          Plan:   Discussed negative results with patient. Patient verbalized understanding. Educated  patient on viral vs bacterial sinus infection/upper respiratory infection. Advised patient to begin OTC xyzal with decongestant, flonase, normal saline nasal spray. Cough Medication prescribed. Patient complains of cough associated with shortness of breathe and voice strain/hoarseness despite trying multiple OTC medication regimens. 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  Agreed upon plan of care as listed below. If symptoms do not resolve, return to clinic for further evaluation. Patient vitals stable. Patient in no acute respiratory distress. Discussed discharge instructions with patient, he has no further questions at this time. Patient exits exam room in no distress.      Viral upper respiratory tract infection with cough  -     POCT COVID-19 Rapid Screening  -     fluticasone propionate (FLONASE) 50 mcg/actuation nasal spray; 2 sprays (100 mcg total) by Each Nostril route once daily.  Dispense: 16 g; Refill: 0  -     promethazine-dextromethorphan (PROMETHAZINE-DM) 6.25-15 mg/5 mL Syrp; Take 5 mLs by mouth every 4 (four) hours as needed (Cough).  Dispense: 118 mL; Refill: 0    Laryngitis  -     methylPREDNISolone (MEDROL DOSEPACK) 4 mg tablet; use as directed  Dispense: 21 each; Refill: 0    Hypertension, unspecified type       Patient Instructions   Elevated Blood Pressure  Your blood pressure was elevated during your visit to the urgent care.  It was not so high that immediate care was needed but it is recommended that you monitor your blood pressure over the next week or two to make sure that it is not staying elevated.  Please have your blood pressure taken 2-3 times daily at different times of the day.  Write all of those blood pressures down and record the time that they were taken.  Keep all that information and take it with you to see your Primary Care Physician.  If your blood pressure is consistently above  140/90 you will need to follow up with your PCP more quickly.  Start taking xyzal as discussed.     Certain OTC cough and cold medications may raise your blood pressure. To keep your blood pressure regulated avoid over-the-counter decongestants and multisymptom cold remedies that contain decongestants -- such as pseudoephedrine, ephedrine, phenylephrine, naphazoline and oxymetazoline. Also, check the label for high sodium content, which can also raise blood pressure.     Please arrange follow up with your primary medical clinic as soon as possible. You must understand that you've received an Urgent Care treatment only and that you may be released before all of your medical problems are known or treated. You, the patient, will arrange for follow up as instructed. If your symptoms worsen or fail to improve you should go to the Emergency Room.

## 2022-12-03 NOTE — PATIENT INSTRUCTIONS
Elevated Blood Pressure  Your blood pressure was elevated during your visit to the urgent care.  It was not so high that immediate care was needed but it is recommended that you monitor your blood pressure over the next week or two to make sure that it is not staying elevated.  Please have your blood pressure taken 2-3 times daily at different times of the day.  Write all of those blood pressures down and record the time that they were taken.  Keep all that information and take it with you to see your Primary Care Physician.  If your blood pressure is consistently above 140/90 you will need to follow up with your PCP more quickly.  Start taking xyzal as discussed.     Certain OTC cough and cold medications may raise your blood pressure. To keep your blood pressure regulated avoid over-the-counter decongestants and multisymptom cold remedies that contain decongestants -- such as pseudoephedrine, ephedrine, phenylephrine, naphazoline and oxymetazoline. Also, check the label for high sodium content, which can also raise blood pressure.     Please arrange follow up with your primary medical clinic as soon as possible. You must understand that you've received an Urgent Care treatment only and that you may be released before all of your medical problems are known or treated. You, the patient, will arrange for follow up as instructed. If your symptoms worsen or fail to improve you should go to the Emergency Room.

## 2022-12-06 ENCOUNTER — HOSPITAL ENCOUNTER (OUTPATIENT)
Dept: RADIOLOGY | Facility: HOSPITAL | Age: 57
Discharge: HOME | End: 2022-12-06
Attending: OBSTETRICS & GYNECOLOGY
Payer: COMMERCIAL

## 2022-12-06 DIAGNOSIS — Z12.39 ENCOUNTER FOR SCREENING FOR MALIGNANT NEOPLASM OF BREAST, UNSPECIFIED SCREENING MODALITY: ICD-10-CM

## 2022-12-06 PROCEDURE — 77063 BREAST TOMOSYNTHESIS BI: CPT | Mod: 26,,, | Performed by: RADIOLOGY

## 2022-12-06 PROCEDURE — 77067 SCR MAMMO BI INCL CAD: CPT | Mod: 26,,, | Performed by: RADIOLOGY

## 2022-12-06 PROCEDURE — 77067 MAMMO DIGITAL SCREENING BILAT WITH TOMO: ICD-10-PCS | Mod: 26,,, | Performed by: RADIOLOGY

## 2022-12-06 PROCEDURE — 77067 SCR MAMMO BI INCL CAD: CPT | Mod: TC

## 2022-12-06 PROCEDURE — 77063 MAMMO DIGITAL SCREENING BILAT WITH TOMO: ICD-10-PCS | Mod: 26,,, | Performed by: RADIOLOGY

## 2022-12-06 PROCEDURE — 77063 BREAST TOMOSYNTHESIS BI: CPT | Mod: TC

## 2022-12-08 NOTE — PROGRESS NOTES
I am happy to report that your recent breast imaging did NOT show evidence of cancer. An annual mammogram is the best test to screen for breast cancer, but it is not perfect, and it can miss some cancers. So, even though your mammogram was normal, if you notice any lump or change in one of your breasts, please schedule an appointment with me for a proper evaluation. Thank you for letting me care for you. I look forward to seeing you again. Sincerely, Dr. Linda Barba

## 2023-02-27 ENCOUNTER — OFFICE VISIT (OUTPATIENT)
Dept: DERMATOLOGY | Facility: CLINIC | Age: 58
End: 2023-02-27
Payer: COMMERCIAL

## 2023-02-27 DIAGNOSIS — B02.9 HERPES ZOSTER WITHOUT COMPLICATION: Primary | ICD-10-CM

## 2023-02-27 PROCEDURE — 1160F PR REVIEW ALL MEDS BY PRESCRIBER/CLIN PHARMACIST DOCUMENTED: ICD-10-PCS | Mod: CPTII,S$GLB,, | Performed by: DERMATOLOGY

## 2023-02-27 PROCEDURE — 99999 PR PBB SHADOW E&M-EST. PATIENT-LVL III: ICD-10-PCS | Mod: PBBFAC,,, | Performed by: DERMATOLOGY

## 2023-02-27 PROCEDURE — 99203 PR OFFICE/OUTPT VISIT, NEW, LEVL III, 30-44 MIN: ICD-10-PCS | Mod: S$GLB,,, | Performed by: DERMATOLOGY

## 2023-02-27 PROCEDURE — 1159F MED LIST DOCD IN RCRD: CPT | Mod: CPTII,S$GLB,, | Performed by: DERMATOLOGY

## 2023-02-27 PROCEDURE — 99203 OFFICE O/P NEW LOW 30 MIN: CPT | Mod: S$GLB,,, | Performed by: DERMATOLOGY

## 2023-02-27 PROCEDURE — 1159F PR MEDICATION LIST DOCUMENTED IN MEDICAL RECORD: ICD-10-PCS | Mod: CPTII,S$GLB,, | Performed by: DERMATOLOGY

## 2023-02-27 PROCEDURE — 1160F RVW MEDS BY RX/DR IN RCRD: CPT | Mod: CPTII,S$GLB,, | Performed by: DERMATOLOGY

## 2023-02-27 PROCEDURE — 99999 PR PBB SHADOW E&M-EST. PATIENT-LVL III: CPT | Mod: PBBFAC,,, | Performed by: DERMATOLOGY

## 2023-02-27 RX ORDER — VALACYCLOVIR HYDROCHLORIDE 1 G/1
1000 TABLET, FILM COATED ORAL 3 TIMES DAILY
Qty: 21 TABLET | Refills: 0 | Status: SHIPPED | OUTPATIENT
Start: 2023-02-27 | End: 2023-03-27

## 2023-02-27 NOTE — PATIENT INSTRUCTIONS
Domeboro Soak   Domeboro soaks (compresses) are used to relieve itching, minor skin irritation, to help weeping skin and to loosen up crusted areas on the skin. These soaks can be purchased over-the-counter from the pharmacy. It comes as either a tablet or powder and needs to be mixed with water.     Mix the Solution:   1) Wash your hands.   2) Put warm water in a clean container.   3) Add the Domeboro medicine.   4) Stir the solution until the tablet or powder dissolves.     Apply the Domeboro soak:   1) Pour Domeboro solution from the large container into a clean bowl.  2) Prepare the patient for the soak by placing the area to be treated over a towel or plastic sheet.   3) Wash your hands.   4) Place a clean cloth towel, paper towel or gauze into the domeboros solution. Soak the affected area with the domeboros cloth for 10-15 minutes.   5) Apply the soak for 10-15 minutes to the affected area 3 times a day.        [facility]   DERMATOLOGY  [address]   [place] [address]   Dept: [phone]   Dept Fax: [phone]

## 2023-02-27 NOTE — PROGRESS NOTES
Subjective:       Patient ID:  Yessenia Glover is a 58 y.o. female who presents for   Chief Complaint   Patient presents with    Adenopathy     Right side of neck, hair line, scalp     History of Present Illness: The patient presents with chief complaint of rash.  Location:  right side of face, neck and upper shoulders  Duration:  1.5 weeks  Signs/Symptoms:  red burning itchy    Prior treatments:  benadryl, calamine lotion, triamcinolone ointment     Review of Systems   Constitutional:  Negative for fever and chills.   Gastrointestinal:  Negative for nausea and vomiting.   Skin:  Positive for itching, rash and activity-related sunscreen use. Negative for daily sunscreen use and recent sunburn.   Hematologic/Lymphatic: Does not bruise/bleed easily.      Objective:    Physical Exam   Constitutional: She appears well-developed and well-nourished. No distress.   Neurological: She is alert and oriented to person, place, and time. She is not disoriented.   Psychiatric: She has a normal mood and affect.   Skin:   Areas Examined (abnormalities noted in diagram):   Head / Face Inspection Performed  Neck Inspection Performed  RUE Inspected  LUE Inspection Performed  Nails and Digits Inspection Performed              Assessment / Plan:        Herpes zoster without complication  -     valACYclovir (VALTREX) 1000 MG tablet; Take 1 tablet (1,000 mg total) by mouth 3 (three) times daily. for 7 days  Dispense: 21 tablet; Refill: 0  -     will start above med, discussed may be less effective since rash present > 72 hours.  Recommend OTC domeboro's soaks.     AVS given on shingles and patient informed that lesions are contagious until they have completely crusted and no new lesions are being formed. Recommend avoidance of pregnant persons, elderly persons, immunocompromised persons or persons who have not been vaccinated or previously had chickenpox.             Follow up in about 3 months (around 5/27/2023).

## 2023-03-01 ENCOUNTER — TELEPHONE (OUTPATIENT)
Dept: INTERNAL MEDICINE | Facility: CLINIC | Age: 58
End: 2023-03-01
Payer: COMMERCIAL

## 2023-03-01 NOTE — TELEPHONE ENCOUNTER
She is taking the Valtrex Rx'd by dermatology but she is having a lot of pain Ibuprofen 800 mg is not helping

## 2023-03-01 NOTE — TELEPHONE ENCOUNTER
----- Message from Chhaya Zhang sent at 3/1/2023 11:17 AM CST -----  Contact: self/[phone]  Pt is calling in regards to speaking to someone about her appointment with dermatology. She states dermatologist thinks that she has shingles and was given medication but still not better. Please give her a call back at [phone]. Thank you s/g

## 2023-03-02 ENCOUNTER — PATIENT MESSAGE (OUTPATIENT)
Dept: DERMATOLOGY | Facility: CLINIC | Age: 58
End: 2023-03-02
Payer: COMMERCIAL

## 2023-03-02 ENCOUNTER — OFFICE VISIT (OUTPATIENT)
Dept: INTERNAL MEDICINE | Facility: CLINIC | Age: 58
End: 2023-03-02
Payer: COMMERCIAL

## 2023-03-02 ENCOUNTER — TELEPHONE (OUTPATIENT)
Dept: DERMATOLOGY | Facility: CLINIC | Age: 58
End: 2023-03-02
Payer: COMMERCIAL

## 2023-03-02 VITALS
BODY MASS INDEX: 29.98 KG/M2 | TEMPERATURE: 98 F | WEIGHT: 169.19 LBS | RESPIRATION RATE: 18 BRPM | HEART RATE: 49 BPM | SYSTOLIC BLOOD PRESSURE: 116 MMHG | HEIGHT: 63 IN | OXYGEN SATURATION: 98 % | DIASTOLIC BLOOD PRESSURE: 76 MMHG

## 2023-03-02 DIAGNOSIS — B02.7 DISSEMINATED HERPES ZOSTER: Primary | ICD-10-CM

## 2023-03-02 PROCEDURE — 1159F PR MEDICATION LIST DOCUMENTED IN MEDICAL RECORD: ICD-10-PCS | Mod: CPTII,S$GLB,, | Performed by: NURSE PRACTITIONER

## 2023-03-02 PROCEDURE — 99999 PR PBB SHADOW E&M-EST. PATIENT-LVL IV: CPT | Mod: PBBFAC,,, | Performed by: NURSE PRACTITIONER

## 2023-03-02 PROCEDURE — 3074F SYST BP LT 130 MM HG: CPT | Mod: CPTII,S$GLB,, | Performed by: NURSE PRACTITIONER

## 2023-03-02 PROCEDURE — 3008F BODY MASS INDEX DOCD: CPT | Mod: CPTII,S$GLB,, | Performed by: NURSE PRACTITIONER

## 2023-03-02 PROCEDURE — 3074F PR MOST RECENT SYSTOLIC BLOOD PRESSURE < 130 MM HG: ICD-10-PCS | Mod: CPTII,S$GLB,, | Performed by: NURSE PRACTITIONER

## 2023-03-02 PROCEDURE — 3008F PR BODY MASS INDEX (BMI) DOCUMENTED: ICD-10-PCS | Mod: CPTII,S$GLB,, | Performed by: NURSE PRACTITIONER

## 2023-03-02 PROCEDURE — 99213 PR OFFICE/OUTPT VISIT, EST, LEVL III, 20-29 MIN: ICD-10-PCS | Mod: S$GLB,,, | Performed by: NURSE PRACTITIONER

## 2023-03-02 PROCEDURE — 3078F PR MOST RECENT DIASTOLIC BLOOD PRESSURE < 80 MM HG: ICD-10-PCS | Mod: CPTII,S$GLB,, | Performed by: NURSE PRACTITIONER

## 2023-03-02 PROCEDURE — 99213 OFFICE O/P EST LOW 20 MIN: CPT | Mod: S$GLB,,, | Performed by: NURSE PRACTITIONER

## 2023-03-02 PROCEDURE — 3078F DIAST BP <80 MM HG: CPT | Mod: CPTII,S$GLB,, | Performed by: NURSE PRACTITIONER

## 2023-03-02 PROCEDURE — 99999 PR PBB SHADOW E&M-EST. PATIENT-LVL IV: ICD-10-PCS | Mod: PBBFAC,,, | Performed by: NURSE PRACTITIONER

## 2023-03-02 PROCEDURE — 1160F PR REVIEW ALL MEDS BY PRESCRIBER/CLIN PHARMACIST DOCUMENTED: ICD-10-PCS | Mod: CPTII,S$GLB,, | Performed by: NURSE PRACTITIONER

## 2023-03-02 PROCEDURE — 1160F RVW MEDS BY RX/DR IN RCRD: CPT | Mod: CPTII,S$GLB,, | Performed by: NURSE PRACTITIONER

## 2023-03-02 PROCEDURE — 1159F MED LIST DOCD IN RCRD: CPT | Mod: CPTII,S$GLB,, | Performed by: NURSE PRACTITIONER

## 2023-03-02 RX ORDER — GABAPENTIN 300 MG/1
300 CAPSULE ORAL 3 TIMES DAILY
Qty: 20 CAPSULE | Refills: 2 | Status: SHIPPED | OUTPATIENT
Start: 2023-03-02 | End: 2024-03-01

## 2023-03-02 RX ORDER — CAPSAICIN 0.75 MG/G
CREAM TOPICAL 3 TIMES DAILY
Qty: 57 G | Refills: 0 | Status: SHIPPED | OUTPATIENT
Start: 2023-03-02

## 2023-03-02 NOTE — PROGRESS NOTES
Yessenia Glover  2023  [id number]    Kellee Suero DO  Patient Care Team:  Kellee Suero DO as PCP - General (Internal Medicine)  Slick Arenas Jr., MD as Obstetrician (Obstetrics)          Visit Type:an urgent visit for a new problem    Chief Complaint:  Chief Complaint   Patient presents with    Herpes Zoster       History of Present Illness:    59 yo female presets with co shingles to the right side of her face, neck and shoulder that started one week.     History:  Past Medical History:   Diagnosis Date    Hyperlipidemia     Hypertension      Past Surgical History:   Procedure Laterality Date    AXILLARY SURGERY Right     Right     SECTION  1997     SECTION  1992    COLONOSCOPY N/A 2019    Procedure: COLONOSCOPY;  Surgeon: Grant Cristobal MD;  Location: [facility];  Service: Endoscopy;  Laterality: N/A;    THYROIDECTOMY, PARTIAL      TUBAL LIGATION       Family History   Problem Relation Age of Onset    Hypertension Mother     Hypertension Father      Social History     Socioeconomic History    Marital status:    Tobacco Use    Smoking status: Never    Smokeless tobacco: Never   Substance and Sexual Activity    Alcohol use: No    Drug use: No    Sexual activity: Yes     Partners: Male     Patient Active Problem List   Diagnosis    Hyperlipidemia LDL goal <160    Chronic right shoulder pain     Review of patient's allergies indicates:   Allergen Reactions    Codeine Itching    Sulfa (sulfonamide antibiotics) Nausea And Vomiting       The following were reviewed at this visit: active problem list, medication list, allergies, family history, social history, and health maintenance.    Medications:  Current Outpatient Medications on File Prior to Visit   Medication Sig Dispense Refill    ascorbic acid/multivit-min (EMERGEN-C ORAL) Take by mouth.      atorvastatin (LIPITOR) 10 MG tablet Take 1 tablet (10 mg total) by mouth once daily. 90 tablet 1     Ca-D3-mag-zinc--leti-boron 600 mg calcium- 800 unit-40 mg Chew       chlorhexidine (PERIDEX) 0.12 % solution DIP TOOTHBRUSH AND BRUSH TWICE DAILY      cloNIDine (CATAPRES) 0.1 MG tablet Take 1 tablet (0.1 mg total) by mouth every evening. 90 tablet 1    fish oil-omega-3 fatty acids 300-1,000 mg capsule Take by mouth once daily.      meloxicam (MOBIC) 7.5 MG tablet Take 1 tablet (7.5 mg total) by mouth once daily. 30 tablet 3    multivitamin-min-iron-FA-vit K (ADULTS MULTIVITAMIN) 18 mg iron-400 mcg-25 mcg Tab       valACYclovir (VALTREX) 1000 MG tablet Take 1 tablet (1,000 mg total) by mouth 3 (three) times daily. for 7 days 21 tablet 0     No current facility-administered medications on file prior to visit.       Medications have been reviewed and reconciled with patient at this visit.  Barriers to medications reviewed with patient.    Adverse reactions to current medications reviewed with patient..    Over the counter medications reviewed and reconciled with patient.    Exam:  Wt Readings from Last 3 Encounters:   03/02/23 76.8 kg (169 lb 3.3 oz)   12/02/22 77.1 kg (170 lb)   10/03/22 80.8 kg (178 lb 2.1 oz)     Temp Readings from Last 3 Encounters:   03/02/23 97.5 °F (36.4 °C) (Temporal)   12/02/22 99 °F (37.2 °C) (Oral)   09/27/22 97.1 °F (36.2 °C)     BP Readings from Last 3 Encounters:   03/02/23 116/76   12/02/22 (!) 155/80   10/03/22 116/78     Pulse Readings from Last 3 Encounters:   03/02/23 (!) 49   12/02/22 91   09/27/22 79     Body mass index is 29.97 kg/m².      Review of Systems   Constitutional:  Negative for fever.   Respiratory:  Negative for cough, shortness of breath and wheezing.    Cardiovascular:  Negative for chest pain and palpitations.   Gastrointestinal:  Negative for nausea.   Skin:  Positive for rash.   Neurological:  Negative for speech change, weakness and headaches.   All other systems reviewed and are negative.  Physical Exam  Vitals and nursing note reviewed.   Constitutional:        Appearance: Normal appearance. She is obese.   HENT:      Head: Normocephalic and atraumatic.      Right Ear: Tympanic membrane, ear canal and external ear normal.      Left Ear: Tympanic membrane, ear canal and external ear normal.      Nose: Nose normal.      Mouth/Throat:      Mouth: Mucous membranes are moist.      Pharynx: Oropharynx is clear.   Eyes:      Extraocular Movements: Extraocular movements intact.      Conjunctiva/sclera: Conjunctivae normal.      Pupils: Pupils are equal, round, and reactive to light.   Cardiovascular:      Rate and Rhythm: Normal rate and regular rhythm.      Pulses: Normal pulses.      Heart sounds: Normal heart sounds.   Pulmonary:      Effort: Pulmonary effort is normal.      Breath sounds: Normal breath sounds.   Abdominal:      General: Bowel sounds are normal.      Palpations: Abdomen is soft.   Musculoskeletal:         General: Normal range of motion.      Cervical back: Normal range of motion and neck supple.   Skin:     General: Skin is warm and dry.      Capillary Refill: Capillary refill takes less than 2 seconds.      Findings: Erythema, lesion and rash present.             Comments: Shingles      Neurological:      General: No focal deficit present.      Mental Status: She is alert and oriented to person, place, and time.   Psychiatric:         Mood and Affect: Mood normal.         Behavior: Behavior normal.         Thought Content: Thought content normal.         Judgment: Judgment normal.       Laboratory Reviewed ({Yes)  Lab Results   Component Value Date    WBC 7.39 02/26/2019    HGB 13.1 02/26/2019    HCT 40.5 02/26/2019     02/26/2019    CHOL 236 (H) 09/20/2022    TRIG 98 09/20/2022    HDL 61 09/20/2022    ALT 20 09/20/2022    AST 26 09/20/2022     09/20/2022    K 4.2 09/20/2022     09/20/2022    CREATININE 0.9 09/20/2022    BUN 12 09/20/2022    CO2 29 09/20/2022    TSH 1.631 10/03/2022       Yessenia was seen today for herpes  zoster.    Diagnoses and all orders for this visit:    Disseminated herpes zoster  -     gabapentin (NEURONTIN) 300 MG capsule; Take 1 capsule (300 mg total) by mouth 3 (three) times daily.  -     capsicum 0.075% (ZOSTRIX) 0.075 % topical cream; Apply topically 3 (three) times daily.        Care Plan/Goals: Reviewed    Goals    Anette Casas was seen today for herpes zoster.    Diagnoses and all orders for this visit:    Disseminated herpes zoster  -     gabapentin (NEURONTIN) 300 MG capsule; Take 1 capsule (300 mg total) by mouth 3 (three) times daily.  -     capsicum 0.075% (ZOSTRIX) 0.075 % topical cream; Apply topically 3 (three) times daily.       Follow up: Follow up in about 6 weeks (around 4/13/2023) for With your PCP.    After visit summary was printed and given to patient upon discharge today.  Patient goals and care plan are included in After Visit Summary.

## 2023-03-02 NOTE — TELEPHONE ENCOUNTER
Attempted to contact pt in regards to pain from shingles. No answer. Message left. Also attempted to contact pt via portal

## 2023-03-21 ENCOUNTER — LAB VISIT (OUTPATIENT)
Dept: LAB | Facility: HOSPITAL | Age: 58
End: 2023-03-21
Attending: INTERNAL MEDICINE
Payer: COMMERCIAL

## 2023-03-21 DIAGNOSIS — E78.5 HYPERLIPIDEMIA LDL GOAL <160: ICD-10-CM

## 2023-03-21 LAB
ALBUMIN SERPL BCP-MCNC: 4 G/DL (ref 3.5–5.2)
ALP SERPL-CCNC: 77 U/L (ref 55–135)
ALT SERPL W/O P-5'-P-CCNC: 21 U/L (ref 10–44)
ANION GAP SERPL CALC-SCNC: 7 MMOL/L (ref 8–16)
AST SERPL-CCNC: 26 U/L (ref 10–40)
BILIRUB SERPL-MCNC: 0.5 MG/DL (ref 0.1–1)
BUN SERPL-MCNC: 9 MG/DL (ref 6–20)
CALCIUM SERPL-MCNC: 10 MG/DL (ref 8.7–10.5)
CHLORIDE SERPL-SCNC: 107 MMOL/L (ref 95–110)
CHOLEST SERPL-MCNC: 257 MG/DL (ref 120–199)
CHOLEST/HDLC SERPL: 4.3 {RATIO} (ref 2–5)
CO2 SERPL-SCNC: 30 MMOL/L (ref 23–29)
CREAT SERPL-MCNC: 1.1 MG/DL (ref 0.5–1.4)
EST. GFR  (NO RACE VARIABLE): 59 ML/MIN/1.73 M^2
GLUCOSE SERPL-MCNC: 100 MG/DL (ref 70–110)
HDLC SERPL-MCNC: 60 MG/DL (ref 40–75)
HDLC SERPL: 23.3 % (ref 20–50)
LDLC SERPL CALC-MCNC: 183.4 MG/DL (ref 63–159)
NONHDLC SERPL-MCNC: 197 MG/DL
POTASSIUM SERPL-SCNC: 4.5 MMOL/L (ref 3.5–5.1)
PROT SERPL-MCNC: 7.1 G/DL (ref 6–8.4)
SODIUM SERPL-SCNC: 144 MMOL/L (ref 136–145)
TRIGL SERPL-MCNC: 68 MG/DL (ref 30–150)

## 2023-03-21 PROCEDURE — 80053 COMPREHEN METABOLIC PANEL: CPT | Performed by: INTERNAL MEDICINE

## 2023-03-21 PROCEDURE — 36415 COLL VENOUS BLD VENIPUNCTURE: CPT | Performed by: INTERNAL MEDICINE

## 2023-03-21 PROCEDURE — 80061 LIPID PANEL: CPT | Performed by: INTERNAL MEDICINE

## 2023-03-27 ENCOUNTER — OFFICE VISIT (OUTPATIENT)
Dept: INTERNAL MEDICINE | Facility: CLINIC | Age: 58
End: 2023-03-27
Payer: COMMERCIAL

## 2023-03-27 ENCOUNTER — LAB VISIT (OUTPATIENT)
Dept: LAB | Facility: HOSPITAL | Age: 58
End: 2023-03-27
Attending: INTERNAL MEDICINE
Payer: COMMERCIAL

## 2023-03-27 VITALS
BODY MASS INDEX: 29.69 KG/M2 | RESPIRATION RATE: 20 BRPM | WEIGHT: 167.56 LBS | HEIGHT: 63 IN | HEART RATE: 80 BPM | OXYGEN SATURATION: 99 % | DIASTOLIC BLOOD PRESSURE: 80 MMHG | SYSTOLIC BLOOD PRESSURE: 110 MMHG | TEMPERATURE: 97 F

## 2023-03-27 DIAGNOSIS — E78.5 HYPERLIPIDEMIA LDL GOAL <160: Primary | ICD-10-CM

## 2023-03-27 DIAGNOSIS — R09.82 POST-NASAL DRIP: ICD-10-CM

## 2023-03-27 DIAGNOSIS — R53.83 FATIGUE, UNSPECIFIED TYPE: ICD-10-CM

## 2023-03-27 DIAGNOSIS — R23.8 SKIN IRRITATION: ICD-10-CM

## 2023-03-27 DIAGNOSIS — Z13.1 SCREENING FOR DIABETES MELLITUS: ICD-10-CM

## 2023-03-27 LAB
BASOPHILS # BLD AUTO: 0.08 K/UL (ref 0–0.2)
BASOPHILS NFR BLD: 1.1 % (ref 0–1.9)
DIFFERENTIAL METHOD: ABNORMAL
EOSINOPHIL # BLD AUTO: 0.1 K/UL (ref 0–0.5)
EOSINOPHIL NFR BLD: 1.7 % (ref 0–8)
ERYTHROCYTE [DISTWIDTH] IN BLOOD BY AUTOMATED COUNT: 14.1 % (ref 11.5–14.5)
ESTIMATED AVG GLUCOSE: 111 MG/DL (ref 68–131)
HBA1C MFR BLD: 5.5 % (ref 4–5.6)
HCT VFR BLD AUTO: 40.4 % (ref 37–48.5)
HGB BLD-MCNC: 12.9 G/DL (ref 12–16)
IMM GRANULOCYTES # BLD AUTO: 0.01 K/UL (ref 0–0.04)
IMM GRANULOCYTES NFR BLD AUTO: 0.1 % (ref 0–0.5)
LYMPHOCYTES # BLD AUTO: 2 K/UL (ref 1–4.8)
LYMPHOCYTES NFR BLD: 28 % (ref 18–48)
MCH RBC QN AUTO: 29.3 PG (ref 27–31)
MCHC RBC AUTO-ENTMCNC: 31.9 G/DL (ref 32–36)
MCV RBC AUTO: 92 FL (ref 82–98)
MONOCYTES # BLD AUTO: 1.5 K/UL (ref 0.3–1)
MONOCYTES NFR BLD: 21 % (ref 4–15)
NEUTROPHILS # BLD AUTO: 3.4 K/UL (ref 1.8–7.7)
NEUTROPHILS NFR BLD: 48.1 % (ref 38–73)
NRBC BLD-RTO: 0 /100 WBC
PLATELET # BLD AUTO: 284 K/UL (ref 150–450)
PMV BLD AUTO: 10.5 FL (ref 9.2–12.9)
RBC # BLD AUTO: 4.4 M/UL (ref 4–5.4)
WBC # BLD AUTO: 7.15 K/UL (ref 3.9–12.7)

## 2023-03-27 PROCEDURE — 3008F BODY MASS INDEX DOCD: CPT | Mod: CPTII,S$GLB,, | Performed by: INTERNAL MEDICINE

## 2023-03-27 PROCEDURE — 99396 PREV VISIT EST AGE 40-64: CPT | Mod: S$GLB,,, | Performed by: INTERNAL MEDICINE

## 2023-03-27 PROCEDURE — 99999 PR PBB SHADOW E&M-EST. PATIENT-LVL V: CPT | Mod: PBBFAC,,, | Performed by: INTERNAL MEDICINE

## 2023-03-27 PROCEDURE — 99999 PR PBB SHADOW E&M-EST. PATIENT-LVL V: ICD-10-PCS | Mod: PBBFAC,,, | Performed by: INTERNAL MEDICINE

## 2023-03-27 PROCEDURE — 3008F PR BODY MASS INDEX (BMI) DOCUMENTED: ICD-10-PCS | Mod: CPTII,S$GLB,, | Performed by: INTERNAL MEDICINE

## 2023-03-27 PROCEDURE — 83036 HEMOGLOBIN GLYCOSYLATED A1C: CPT | Performed by: INTERNAL MEDICINE

## 2023-03-27 PROCEDURE — 85025 COMPLETE CBC W/AUTO DIFF WBC: CPT | Performed by: INTERNAL MEDICINE

## 2023-03-27 PROCEDURE — 3079F DIAST BP 80-89 MM HG: CPT | Mod: CPTII,S$GLB,, | Performed by: INTERNAL MEDICINE

## 2023-03-27 PROCEDURE — 3079F PR MOST RECENT DIASTOLIC BLOOD PRESSURE 80-89 MM HG: ICD-10-PCS | Mod: CPTII,S$GLB,, | Performed by: INTERNAL MEDICINE

## 2023-03-27 PROCEDURE — 1160F PR REVIEW ALL MEDS BY PRESCRIBER/CLIN PHARMACIST DOCUMENTED: ICD-10-PCS | Mod: CPTII,S$GLB,, | Performed by: INTERNAL MEDICINE

## 2023-03-27 PROCEDURE — 1160F RVW MEDS BY RX/DR IN RCRD: CPT | Mod: CPTII,S$GLB,, | Performed by: INTERNAL MEDICINE

## 2023-03-27 PROCEDURE — 1159F PR MEDICATION LIST DOCUMENTED IN MEDICAL RECORD: ICD-10-PCS | Mod: CPTII,S$GLB,, | Performed by: INTERNAL MEDICINE

## 2023-03-27 PROCEDURE — 84443 ASSAY THYROID STIM HORMONE: CPT | Performed by: INTERNAL MEDICINE

## 2023-03-27 PROCEDURE — 36415 COLL VENOUS BLD VENIPUNCTURE: CPT | Performed by: INTERNAL MEDICINE

## 2023-03-27 PROCEDURE — 99213 OFFICE O/P EST LOW 20 MIN: CPT | Mod: 25,S$GLB,, | Performed by: INTERNAL MEDICINE

## 2023-03-27 PROCEDURE — 99396 PR PREVENTIVE VISIT,EST,40-64: ICD-10-PCS | Mod: S$GLB,,, | Performed by: INTERNAL MEDICINE

## 2023-03-27 PROCEDURE — 3074F PR MOST RECENT SYSTOLIC BLOOD PRESSURE < 130 MM HG: ICD-10-PCS | Mod: CPTII,S$GLB,, | Performed by: INTERNAL MEDICINE

## 2023-03-27 PROCEDURE — 3074F SYST BP LT 130 MM HG: CPT | Mod: CPTII,S$GLB,, | Performed by: INTERNAL MEDICINE

## 2023-03-27 PROCEDURE — 1159F MED LIST DOCD IN RCRD: CPT | Mod: CPTII,S$GLB,, | Performed by: INTERNAL MEDICINE

## 2023-03-27 PROCEDURE — 99213 PR OFFICE/OUTPT VISIT, EST, LEVL III, 20-29 MIN: ICD-10-PCS | Mod: 25,S$GLB,, | Performed by: INTERNAL MEDICINE

## 2023-03-27 RX ORDER — TRIAMCINOLONE ACETONIDE 1 MG/G
CREAM TOPICAL 2 TIMES DAILY
Qty: 45 G | Refills: 0 | Status: SHIPPED | OUTPATIENT
Start: 2023-03-27 | End: 2023-04-10

## 2023-03-27 NOTE — PROGRESS NOTES
Yessenia ALYCIA Adalberto  58 y.o. Black or  female  [id number]    Chief Complaint:  Chief Complaint   Patient presents with    Annual Exam       History of Present Illness:  Presents to the clinic for an annual exam.   HLD--worsened. She admits to medication non compliance.   She has been feeling fatigued lately. She passed out yesterday and went to [facility]. She was told she had vasovagal syncope. She admits to not drinking much fluids lately and thinks that may be the cause. She denies chest pain or palpitations.   She has been having a post nasal drip. She has been taking Claritin but feels it is not working. She has Xyzal at home but has not taken it in a while.   She recently had shingles. The lesions on her neck have dried up but have been itchy/irritated.     Laboratory   Lab Results   Component Value Date    WBC 7.39 02/26/2019    HGB 13.1 02/26/2019    HCT 40.5 02/26/2019     02/26/2019    CHOL 257 (H) 03/21/2023    TRIG 68 03/21/2023    HDL 60 03/21/2023    ALT 21 03/21/2023    AST 26 03/21/2023     03/21/2023    K 4.5 03/21/2023     03/21/2023    CREATININE 1.1 03/21/2023    BUN 9 03/21/2023    CO2 30 (H) 03/21/2023    TSH 1.631 10/03/2022     Review of Systems   Constitutional:  Positive for malaise/fatigue. Negative for fever.   Respiratory:  Positive for cough. Negative for shortness of breath.    Cardiovascular:  Negative for chest pain and palpitations.   Gastrointestinal:  Negative for abdominal pain and diarrhea.   Genitourinary:  Negative for dysuria.   Neurological:  Positive for loss of consciousness. Negative for seizures and headaches.   Endo/Heme/Allergies:  Positive for environmental allergies.     The following were reviewed: Active problem list, medication list, allergies, family history, social history, and Health Maintenance.     History:  Past Medical History:   Diagnosis Date    Hyperlipidemia     Hypertension      Past Surgical History:    Procedure Laterality Date    AXILLARY SURGERY Right     Right     SECTION  1997     SECTION  1992    COLONOSCOPY N/A 2019    Procedure: COLONOSCOPY;  Surgeon: Grant Cristobal MD;  Location: [facility];  Service: Endoscopy;  Laterality: N/A;    THYROIDECTOMY, PARTIAL      TUBAL LIGATION       Family History   Problem Relation Age of Onset    Hypertension Mother     Hypertension Father      Social History     Socioeconomic History    Marital status:    Tobacco Use    Smoking status: Never    Smokeless tobacco: Never   Substance and Sexual Activity    Alcohol use: No    Drug use: No    Sexual activity: Yes     Partners: Male     Patient Active Problem List   Diagnosis    Hyperlipidemia LDL goal <160    Chronic right shoulder pain     Review of patient's allergies indicates:   Allergen Reactions    Codeine Itching    Sulfa (sulfonamide antibiotics) Nausea And Vomiting       Health Maintenance  Health Maintenance Topics with due status: Not Due       Topic Last Completion Date    TETANUS VACCINE 2019    Colorectal Cancer Screening 2019    Cervical Cancer Screening 2022    Mammogram 2022    Lipid Panel 2023     Health Maintenance Due   Topic Date Due    Hemoglobin A1c (Diabetic Prevention Screening)  Never done    Shingles Vaccine (1 of 2) Never done    COVID-19 Vaccine (5 - Booster) 06/10/2021    Influenza Vaccine (1) 2022       Medications:  Current Outpatient Medications on File Prior to Visit   Medication Sig Dispense Refill    ascorbic acid/multivit-min (EMERGEN-C ORAL) Take by mouth.      atorvastatin (LIPITOR) 10 MG tablet Take 1 tablet (10 mg total) by mouth once daily. 90 tablet 1    Ca-D3-mag-zinc--leti-boron 600 mg calcium- 800 unit-40 mg Chew       capsicum 0.075% (ZOSTRIX) 0.075 % topical cream Apply topically 3 (three) times daily. 57 g 0    chlorhexidine (PERIDEX) 0.12 % solution DIP TOOTHBRUSH AND BRUSH TWICE DAILY       cloNIDine (CATAPRES) 0.1 MG tablet Take 1 tablet (0.1 mg total) by mouth every evening. 90 tablet 1    fish oil-omega-3 fatty acids 300-1,000 mg capsule Take by mouth once daily.      gabapentin (NEURONTIN) 300 MG capsule Take 1 capsule (300 mg total) by mouth 3 (three) times daily. 20 capsule 2    meloxicam (MOBIC) 7.5 MG tablet Take 1 tablet (7.5 mg total) by mouth once daily. 30 tablet 3    multivitamin-min-iron-FA-vit K (ADULTS MULTIVITAMIN) 18 mg iron-400 mcg-25 mcg Tab       valACYclovir (VALTREX) 1000 MG tablet Take 1 tablet (1,000 mg total) by mouth 3 (three) times daily. for 7 days 21 tablet 0     No current facility-administered medications on file prior to visit.       Medications have been reviewed and reconciled with patient at visit today.    Exam:  Vitals:    03/27/23 0844   BP: 110/80   Pulse: 80   Resp: 20   Temp: 96.6 °F (35.9 °C)     Weight: 76 kg (167 lb 8.8 oz)   Body mass index is 29.68 kg/m².      Physical Exam  Vitals reviewed.   Constitutional:       General: She is not in acute distress.     Appearance: She is well-developed. She is not ill-appearing.   Eyes:      General: No scleral icterus.  Cardiovascular:      Rate and Rhythm: Normal rate and regular rhythm.      Heart sounds: Normal heart sounds.   Pulmonary:      Effort: Pulmonary effort is normal. No respiratory distress.      Breath sounds: Normal breath sounds. No wheezing or rales.   Abdominal:      General: Bowel sounds are normal.      Palpations: Abdomen is soft.   Skin:     General: Skin is warm and dry.      Comments: Hyperpigmented lesions on neck (primarily on the right).   Neurological:      Mental Status: She is alert and oriented to person, place, and time.   Psychiatric:         Behavior: Behavior normal.       Assessment:  The primary encounter diagnosis was Hyperlipidemia LDL goal <160. Diagnoses of Fatigue, unspecified type, Post-nasal drip, Skin irritation, and Screening for diabetes mellitus were also pertinent to  this visit.    Plan:  Hyperlipidemia LDL goal <160  -     Counseled regarding medication compliance  -     Comprehensive Metabolic Panel; Standing  -     Lipid Panel; Standing    Fatigue, unspecified type  -     TSH; Future  -     CBC Auto Differential; Future; Expected date: 03/27/2023    Post-nasal drip  -     recommend Xyzal    Skin irritation  -     triamcinolone acetonide 0.1% (KENALOG) 0.1 % cream; Apply topically 2 (two) times daily. for 14 days  Dispense: 45 g; Refill: 0    Screening for diabetes mellitus  -     Hemoglobin A1C; Future; Expected date: 06/24/2023    Labs today.     Follow up in about 1 year (around 3/27/2024).

## 2023-03-28 LAB — TSH SERPL DL<=0.005 MIU/L-ACNC: 0.84 UIU/ML (ref 0.4–4)

## 2023-08-07 ENCOUNTER — TELEPHONE (OUTPATIENT)
Dept: DERMATOLOGY | Facility: CLINIC | Age: 58
End: 2023-08-07
Payer: COMMERCIAL

## 2023-08-07 NOTE — TELEPHONE ENCOUNTER
Returned call. Pt scheduled for appt tomorrow with dr. Rendon for 10 at the [facility]. Verbalized understanding   ----- Message from Yuly Woodruff sent at 8/7/2023  3:51 PM CDT -----  Contact: Yessenia  .Type:  Sooner Apoointment Request    Caller is requesting a sooner appointment.    Name of Caller:Yessenia  When is the first available appointment? 09/15  Symptoms:had shingles in march, feels like a flare up is occurring .. Itching and redness and creating hives/whelps  Would the patient rather a call back or a response via [facility]? Callback or uStudiohart message if pt misses call  Best Call Back Number:[phone]  Additional Information: pt is requesting an appt this month, as soon as possible            Thanks  ALICE

## 2023-08-08 ENCOUNTER — OFFICE VISIT (OUTPATIENT)
Dept: DERMATOLOGY | Facility: CLINIC | Age: 58
End: 2023-08-08
Payer: COMMERCIAL

## 2023-08-08 DIAGNOSIS — L98.9 ECZEMATOUS SKIN LESIONS: Primary | ICD-10-CM

## 2023-08-08 DIAGNOSIS — L23.89 ALLERGIC CONTACT DERMATITIS DUE TO OTHER AGENTS: ICD-10-CM

## 2023-08-08 PROCEDURE — 99214 OFFICE O/P EST MOD 30 MIN: CPT | Mod: 25,S$GLB,, | Performed by: DERMATOLOGY

## 2023-08-08 PROCEDURE — 1160F PR REVIEW ALL MEDS BY PRESCRIBER/CLIN PHARMACIST DOCUMENTED: ICD-10-PCS | Mod: CPTII,S$GLB,, | Performed by: DERMATOLOGY

## 2023-08-08 PROCEDURE — 1159F PR MEDICATION LIST DOCUMENTED IN MEDICAL RECORD: ICD-10-PCS | Mod: CPTII,S$GLB,, | Performed by: DERMATOLOGY

## 2023-08-08 PROCEDURE — 99999 PR PBB SHADOW E&M-EST. PATIENT-LVL III: CPT | Mod: PBBFAC,,, | Performed by: DERMATOLOGY

## 2023-08-08 PROCEDURE — 1160F RVW MEDS BY RX/DR IN RCRD: CPT | Mod: CPTII,S$GLB,, | Performed by: DERMATOLOGY

## 2023-08-08 PROCEDURE — 99999 PR PBB SHADOW E&M-EST. PATIENT-LVL III: ICD-10-PCS | Mod: PBBFAC,,, | Performed by: DERMATOLOGY

## 2023-08-08 PROCEDURE — 96372 THER/PROPH/DIAG INJ SC/IM: CPT | Mod: S$GLB,,, | Performed by: DERMATOLOGY

## 2023-08-08 PROCEDURE — 96372 PR INJECTION,THERAP/PROPH/DIAG2ST, IM OR SUBCUT: ICD-10-PCS | Mod: S$GLB,,, | Performed by: DERMATOLOGY

## 2023-08-08 PROCEDURE — 1159F MED LIST DOCD IN RCRD: CPT | Mod: CPTII,S$GLB,, | Performed by: DERMATOLOGY

## 2023-08-08 PROCEDURE — 3044F PR MOST RECENT HEMOGLOBIN A1C LEVEL <7.0%: ICD-10-PCS | Mod: CPTII,S$GLB,, | Performed by: DERMATOLOGY

## 2023-08-08 PROCEDURE — 99214 PR OFFICE/OUTPT VISIT, EST, LEVL IV, 30-39 MIN: ICD-10-PCS | Mod: 25,S$GLB,, | Performed by: DERMATOLOGY

## 2023-08-08 PROCEDURE — 3044F HG A1C LEVEL LT 7.0%: CPT | Mod: CPTII,S$GLB,, | Performed by: DERMATOLOGY

## 2023-08-08 RX ORDER — TRIAMCINOLONE ACETONIDE 40 MG/ML
40 INJECTION, SUSPENSION INTRA-ARTICULAR; INTRAMUSCULAR
Status: COMPLETED | OUTPATIENT
Start: 2023-08-08 | End: 2023-08-08

## 2023-08-08 RX ORDER — MOMETASONE FUROATE 1 MG/G
CREAM TOPICAL
Qty: 45 G | Refills: 3 | Status: SHIPPED | OUTPATIENT
Start: 2023-08-08

## 2023-08-08 RX ADMIN — TRIAMCINOLONE ACETONIDE 40 MG: 40 INJECTION, SUSPENSION INTRA-ARTICULAR; INTRAMUSCULAR at 10:08

## 2023-08-08 NOTE — PROGRESS NOTES
Two patient identifiers used. Patient administered triamcinolone acetonide 40mg IM to the left ventrogluteal site per order by Dr. Corry Rendon. Pt tolerated well. Pt experienced no adverse affects 15 minutes post administration.

## 2023-08-08 NOTE — PATIENT INSTRUCTIONS
New Skin Care Regimen  Soap: Dove sensitive skin bar or liquid  Moisturizer: vanicream, ceraVe or cetaphil cream  Detergent: Tide Free, All Free, or Cheer Free   Fabric softener: do not use  Colognes/Perfumes/Fragrances: do not use  Bathing: shower or bathe with lukewarm water < 10 minutes  Deodorant: Dove sensitive

## 2023-08-08 NOTE — PROGRESS NOTES
Subjective:      Patient ID:  Yessenia Glover is a 58 y.o. female who presents for   Chief Complaint   Patient presents with    Herpes Zoster     Patches on neck,chin, chest, arms, back, legs  Ones on neck, chin, and chest bother her the most and are itchy.  Patches sting/burn at night     Hx of herpes zoster of the right neck/shoulder, last seen on 2/27/23.  Here today for new issue:    History of Present Illness: The patient presents with chief complaint of rash.  Location: neck, chest  Duration: several weeks  Signs/Symptoms: pruritus    Prior treatments: TAC 0.025% cream, OTC capscaicin, warm showers          Review of Systems   Constitutional:  Negative for fever and chills.   Gastrointestinal:  Negative for nausea and vomiting.   Skin:  Positive for itching, rash and activity-related sunscreen use. Negative for daily sunscreen use and recent sunburn.   Hematologic/Lymphatic: Does not bruise/bleed easily.       Objective:   Physical Exam   Constitutional: She appears well-developed and well-nourished. No distress.   Neurological: She is alert and oriented to person, place, and time. She is not disoriented.   Psychiatric: She has a normal mood and affect.   Skin:   Areas Examined (abnormalities noted in diagram):   Head / Face Inspection Performed  Neck Inspection Performed  Chest / Axilla Inspection Performed  Abdomen Inspection Performed  Back Inspection Performed  RUE Inspected  LUE Inspection Performed  Nails and Digits Inspection Performed             Assessment / Plan:        Eczematous skin lesions  Allergic contact dermatitis due to other agents  -     mometasone 0.1% (ELOCON) 0.1 % cream; AAA bid prn. Do not use on face, underarms or groin. Stronger steroid.  Dispense: 45 g; Refill: 3  -     triamcinolone acetonide injection 40 mg  -     will start above med and IM steroid x 1. Recommend d/c perfumes, continue change of detergent and avoidance of fabric softeners.  Discussed change to dove sensitive  bar and deodorant.            Follow up in about 3 months (around 11/8/2023).

## 2023-12-20 DIAGNOSIS — Z12.31 OTHER SCREENING MAMMOGRAM: ICD-10-CM

## 2024-01-16 ENCOUNTER — OFFICE VISIT (OUTPATIENT)
Dept: URGENT CARE | Facility: CLINIC | Age: 59
End: 2024-01-16
Payer: COMMERCIAL

## 2024-01-16 VITALS
BODY MASS INDEX: 30.53 KG/M2 | HEART RATE: 90 BPM | TEMPERATURE: 101 F | RESPIRATION RATE: 18 BRPM | DIASTOLIC BLOOD PRESSURE: 60 MMHG | HEIGHT: 63 IN | SYSTOLIC BLOOD PRESSURE: 131 MMHG | WEIGHT: 172.31 LBS | OXYGEN SATURATION: 99 %

## 2024-01-16 DIAGNOSIS — U07.1 COVID-19: Primary | ICD-10-CM

## 2024-01-16 DIAGNOSIS — J34.9 SINUS PROBLEM: ICD-10-CM

## 2024-01-16 LAB
CTP QC/QA: YES
SARS-COV-2 AG RESP QL IA.RAPID: POSITIVE

## 2024-01-16 PROCEDURE — 99214 OFFICE O/P EST MOD 30 MIN: CPT | Mod: S$GLB,,, | Performed by: NURSE PRACTITIONER

## 2024-01-16 PROCEDURE — 87811 SARS-COV-2 COVID19 W/OPTIC: CPT | Mod: QW,S$GLB,, | Performed by: NURSE PRACTITIONER

## 2024-01-16 RX ORDER — NIRMATRELVIR AND RITONAVIR 300-100 MG
KIT ORAL
Qty: 30 TABLET | Refills: 0 | Status: SHIPPED | OUTPATIENT
Start: 2024-01-16 | End: 2024-01-21

## 2024-01-16 RX ORDER — PROMETHAZINE HYDROCHLORIDE AND DEXTROMETHORPHAN HYDROBROMIDE 6.25; 15 MG/5ML; MG/5ML
5 SYRUP ORAL EVERY 6 HOURS PRN
Qty: 180 ML | Refills: 0 | Status: SHIPPED | OUTPATIENT
Start: 2024-01-16

## 2024-01-16 RX ORDER — IPRATROPIUM BROMIDE 21 UG/1
2 SPRAY, METERED NASAL 3 TIMES DAILY PRN
Qty: 30 ML | Refills: 0 | Status: SHIPPED | OUTPATIENT
Start: 2024-01-16

## 2024-01-16 RX ORDER — ONDANSETRON 8 MG/1
8 TABLET, ORALLY DISINTEGRATING ORAL EVERY 8 HOURS PRN
Qty: 12 TABLET | Refills: 0 | Status: SHIPPED | OUTPATIENT
Start: 2024-01-16

## 2024-01-16 RX ORDER — ACETAMINOPHEN 500 MG
1000 TABLET ORAL
Status: COMPLETED | OUTPATIENT
Start: 2024-01-16 | End: 2024-01-16

## 2024-01-16 RX ADMIN — Medication 1000 MG: at 10:01

## 2024-01-16 NOTE — PROGRESS NOTES
"Subjective:      Patient ID: Yessenia Glover is a 58 y.o. female.    Vitals:  height is 5' 3.39" (1.61 m) and weight is 78.1 kg (172 lb 4.6 oz). Her tympanic temperature is 100.6 °F (38.1 °C) (abnormal). Her blood pressure is 131/60 and her pulse is 90. Her respiration is 18 and oxygen saturation is 99%.     Chief Complaint: Sinus Problem    Yessenia Glover is a 58 year old female who is presenting for evaluation of headache. Associated sxs include sinus congestion, productive cough, and sneezing which onset > 1 week ago. Treatments tried otc oral decongestants/ antihistamines and aleeve for headache which has provided no relief.    Sinus Problem  This is a new problem. The current episode started in the past 7 days. The problem is unchanged. Her pain is at a severity of 7/10. Associated symptoms include chills, congestion, coughing, headaches and sneezing. Pertinent negatives include no sore throat. Past treatments include oral decongestants. The treatment provided no relief.       Constitution: Positive for chills.   HENT:  Positive for congestion. Negative for sore throat.    Respiratory:  Positive for cough.    Allergic/Immunologic: Positive for sneezing.   Neurological:  Positive for headaches.      Objective:     Vitals:    01/16/24 0949   BP: 131/60   BP Location: Left arm   Patient Position: Sitting   BP Method: Large (Automatic)   Pulse: 90   Resp: 18   Temp: (!) 100.6 °F (38.1 °C)   TempSrc: Tympanic   SpO2: 99%   Weight: 78.1 kg (172 lb 4.6 oz)   Height: 5' 3.39" (1.61 m)       Physical Exam   Constitutional: She is oriented to person, place, and time. She appears well-developed. She is cooperative.  Non-toxic appearance. She does not appear ill. No distress.   HENT:   Head: Normocephalic and atraumatic.   Ears:   Right Ear: Hearing, tympanic membrane, external ear and ear canal normal.   Left Ear: Hearing, tympanic membrane, external ear and ear canal normal.   Nose: Rhinorrhea and congestion " present. No mucosal edema or nasal deformity. No epistaxis. Right sinus exhibits no maxillary sinus tenderness and no frontal sinus tenderness. Left sinus exhibits no maxillary sinus tenderness and no frontal sinus tenderness.   Mouth/Throat: Uvula is midline, oropharynx is clear and moist and mucous membranes are normal. Mucous membranes are moist. No trismus in the jaw. Normal dentition. No uvula swelling. No oropharyngeal exudate, posterior oropharyngeal edema or posterior oropharyngeal erythema.   Eyes: Conjunctivae and lids are normal. Pupils are equal, round, and reactive to light. No scleral icterus. Extraocular movement intact   Neck: Trachea normal and phonation normal. Neck supple. No edema present. No erythema present. No neck rigidity present.   Cardiovascular: Normal rate, regular rhythm, normal heart sounds and normal pulses.   Pulmonary/Chest: Effort normal and breath sounds normal. No respiratory distress. She has no decreased breath sounds. She has no rhonchi.   Abdominal: Normal appearance.   Musculoskeletal: Normal range of motion.         General: No deformity. Normal range of motion.   Neurological: She is alert and oriented to person, place, and time. She exhibits normal muscle tone. Coordination normal.   Skin: Skin is warm, dry, intact, not diaphoretic and not pale.   Psychiatric: Her speech is normal and behavior is normal. Judgment and thought content normal.   Nursing note and vitals reviewed.      Assessment:     1. COVID-19    2. Sinus problem      Results for orders placed or performed in visit on 01/16/24   SARS Coronavirus 2 Antigen, POCT Manual Read   Result Value Ref Range    SARS Coronavirus 2 Antigen Positive (A) Negative     Acceptable Yes        Plan:   Patient stable for discharge and home management of condition       COVID-19  -     nirmatrelvir-ritonavir (PAXLOVID) 300 mg (150 mg x 2)-100 mg copackaged tablets (EUA); Take 3 tablets by mouth 2 (two) times  daily. Each dose contains 2 nirmatrelvir (pink tablets) and 1 ritonavir (white tablet). Take all 3 tablets together  HOLD STATIN X 1 WEEK  Dispense: 30 tablet; Refill: 0  -     promethazine-dextromethorphan (PROMETHAZINE-DM) 6.25-15 mg/5 mL Syrp; Take 5 mLs by mouth every 6 (six) hours as needed (cough/congestion).  Dispense: 180 mL; Refill: 0  -     ipratropium (ATROVENT) 21 mcg (0.03 %) nasal spray; 2 sprays by Each Nostril route 3 (three) times daily as needed.  Dispense: 30 mL; Refill: 0  -     ondansetron (ZOFRAN-ODT) 8 MG TbDL; Take 1 tablet (8 mg total) by mouth every 8 (eight) hours as needed (NAUSEA VOMITING).  Dispense: 12 tablet; Refill: 0  -     acetaminophen tablet 1,000 mg    Sinus problem  -     SARS Coronavirus 2 Antigen, POCT Manual Read      Patient Instructions     QUARANTINE  DAYS #1-5   ENDING 01/20/2024  MASKING DAYS #6-10 01/21/2024-01/25/2024    Any weakness, fever not responding to meds, shortness of breath or chest pain go to local ER for hospital care      Instructions for Patients with Confirmed or Suspected COVID-19    If you are awaiting your test result, you will either be called or it will be released to the patient portal.  If you have any questions about your test, please visit [email or web address] or call our COVID-19 information line at [phone].      Please isolate yourself at home.  You may leave home and/or return to work once the following conditions are met:    If you have symptoms and tested positive:  More than 5 days since symptoms first appeared AND  More than 24 hours fever free without medications AND       symptoms have improved   For five days after ending isolation, masks are required.    If you had no symptoms but tested positive:  More than 5 days since the date of the first positive test. If you develop symptoms, then use the guidelines above  For five days after ending isolation, masks are required.      Testing is not recommended if you are symptom  free after completing isolation.

## 2024-01-16 NOTE — PATIENT INSTRUCTIONS
QUARANTINE  DAYS #1-5   ENDING 01/20/2024  MASKING DAYS #6-10 01/21/2024-01/25/2024    Any weakness, fever not responding to meds, shortness of breath or chest pain go to local ER for hospital care      Instructions for Patients with Confirmed or Suspected COVID-19    If you are awaiting your test result, you will either be called or it will be released to the patient portal.  If you have any questions about your test, please visit [email or web address] or call our COVID-19 information line at [phone].      Please isolate yourself at home.  You may leave home and/or return to work once the following conditions are met:    If you have symptoms and tested positive:  More than 5 days since symptoms first appeared AND  More than 24 hours fever free without medications AND       symptoms have improved   For five days after ending isolation, masks are required.    If you had no symptoms but tested positive:  More than 5 days since the date of the first positive test. If you develop symptoms, then use the guidelines above  For five days after ending isolation, masks are required.      Testing is not recommended if you are symptom free after completing isolation.

## 2024-01-16 NOTE — LETTER
January 16, 2024      [facility]  [address], [address]  [facility] [address]  Phone: [phone]  Fax: [phone]       Patient: Yessenia Glover   YOB: 1965  Date of Visit: 01/16/2024    To Whom It May Concern:    Octavio Glover  was at [facility] on 01/16/2024. The patient may return to work/school on 01/21/2024 with restrictions. If you have any questions or concerns, or if I can be of further assistance, please do not hesitate to contact me.    Sincerely,          Tye Mayorga, NP

## 2024-05-14 ENCOUNTER — OFFICE VISIT (OUTPATIENT)
Dept: URGENT CARE | Facility: CLINIC | Age: 59
End: 2024-05-14
Payer: COMMERCIAL

## 2024-05-14 ENCOUNTER — HOSPITAL ENCOUNTER (OUTPATIENT)
Dept: RADIOLOGY | Facility: CLINIC | Age: 59
Discharge: HOME | End: 2024-05-14
Attending: NURSE PRACTITIONER
Payer: COMMERCIAL

## 2024-05-14 VITALS
WEIGHT: 172 LBS | SYSTOLIC BLOOD PRESSURE: 133 MMHG | HEIGHT: 63 IN | HEART RATE: 73 BPM | TEMPERATURE: 98 F | RESPIRATION RATE: 18 BRPM | OXYGEN SATURATION: 99 % | BODY MASS INDEX: 30.48 KG/M2 | DIASTOLIC BLOOD PRESSURE: 64 MMHG

## 2024-05-14 DIAGNOSIS — M25.512 ACUTE PAIN OF LEFT SHOULDER: ICD-10-CM

## 2024-05-14 DIAGNOSIS — M25.612 DECREASED RANGE OF MOTION OF LEFT SHOULDER: ICD-10-CM

## 2024-05-14 DIAGNOSIS — M25.512 ACUTE PAIN OF LEFT SHOULDER: Primary | ICD-10-CM

## 2024-05-14 PROCEDURE — 73030 X-RAY EXAM OF SHOULDER: CPT | Mod: LT,S$GLB,, | Performed by: RADIOLOGY

## 2024-05-14 PROCEDURE — 99214 OFFICE O/P EST MOD 30 MIN: CPT | Mod: 25,S$GLB,, | Performed by: NURSE PRACTITIONER

## 2024-05-14 PROCEDURE — 96372 THER/PROPH/DIAG INJ SC/IM: CPT | Mod: S$GLB,,, | Performed by: NURSE PRACTITIONER

## 2024-05-14 RX ORDER — KETOROLAC TROMETHAMINE 30 MG/ML
30 INJECTION, SOLUTION INTRAMUSCULAR; INTRAVENOUS
Status: COMPLETED | OUTPATIENT
Start: 2024-05-14 | End: 2024-05-14

## 2024-05-14 RX ADMIN — KETOROLAC TROMETHAMINE 30 MG: 30 INJECTION, SOLUTION INTRAMUSCULAR; INTRAVENOUS at 10:05

## 2024-05-14 NOTE — PATIENT INSTRUCTIONS
Hot showers  Heating pad  Gentle stretching  You received a Ketoralac injection in clinic today-AVOID additional NSAIDs x 24 hours  Starting tomorrow you may resume Mobic as directed for pain  Typical course and duration of illness discussed  Signs and symptoms of worsening discussed  Follow up as needed/with worsening  Schedule with Physical Therapy

## 2024-05-14 NOTE — PROGRESS NOTES
"Subjective:      Patient ID: Yessenia Glover is a 59 y.o. female.    Vitals:  height is 5' 3.39" (1.61 m) and weight is 78 kg (172 lb). Her tympanic temperature is 98.2 °F (36.8 °C). Her blood pressure is 133/64 and her pulse is 73. Her respiration is 18 and oxygen saturation is 99%.     Chief Complaint: Shoulder Pain    59 year old female presents for evaluation of left shoulder pain (8/10 ache) and decreased range of motion x 2 days. States that she woke up Sunday morning with symptoms. Denies injury/trauma.  Works at [employer].  OTC Tylenol without relief. Reports recent history of right frozen shoulder that has since resolved. Lays on left side to compensate for right shoulder pain    Shoulder Pain   The pain is present in the left shoulder and left arm. This is a new problem. The current episode started in the past 7 days. There has been no history of extremity trauma. The problem occurs constantly. The problem has been unchanged. The quality of the pain is described as aching. The pain is at a severity of 6/10. The pain is moderate. Associated symptoms include a limited range of motion and stiffness. Pertinent negatives include no fever, headaches, inability to bear weight, itching, joint locking, joint swelling, numbness, tingling or visual symptoms. The symptoms are aggravated by activity. She has tried acetaminophen for the symptoms. The treatment provided no relief. Family history does not include arthritis. There is no history of diabetes, Injuries to Extremity or migraines.       Constitution: Negative. Negative for fever.   HENT: Negative.     Neck: neck negative.   Cardiovascular: Negative.    Eyes: Negative.    Respiratory: Negative.     Gastrointestinal: Negative.    Endocrine: negative.   Genitourinary: Negative.    Musculoskeletal:  Positive for joint pain (left shoulder) and abnormal ROM of joint. Negative for trauma.   Skin: Negative.    Allergic/Immunologic: Negative.    Neurological: " Negative.  Negative for headaches, numbness and tingling.   Hematologic/Lymphatic: Negative.    Psychiatric/Behavioral: Negative.        Objective:     Physical Exam   Constitutional: She is oriented to person, place, and time. She is cooperative.  Non-toxic appearance. She does not appear ill. She appears distressed. normalawake  HENT:   Head: Normocephalic and atraumatic.   Ears:   Right Ear: Hearing normal.   Left Ear: Hearing normal.   Eyes: Conjunctivae are normal. Pupils are equal, round, and reactive to light. Right eye exhibits no discharge. Left eye exhibits no discharge. No scleral icterus. Extraocular movement intact   Neck: Neck supple.   Cardiovascular: Normal rate.   Pulmonary/Chest: Effort normal.   Abdominal: Normal appearance.   Musculoskeletal:         General: Tenderness (left shoulder) present. No swelling, deformity or signs of injury.      Right shoulder: Normal.      Left shoulder: She exhibits decreased range of motion, tenderness, bony tenderness and decreased strength. She exhibits no swelling, no effusion, no crepitus, no deformity, no laceration and normal pulse.      Right lower leg: No edema.      Left lower leg: No edema.   Neurological: no focal deficit. She is alert and oriented to person, place, and time.   Skin: Skin is warm, dry and not diaphoretic.   Psychiatric: Her behavior is normal. Mood, judgment and thought content normal.   Nursing note and vitals reviewed.    X-Ray Shoulder 2 or More Views Left    Result Date: 5/14/2024  EXAM: XR SHOULDER COMPLETE 2 OR MORE VIEWS LEFT CLINICAL INDICATION:   Pain in left shoulder FINDINGS:  No comparison studies are available.  3 views of the left shoulder were submitted for interpretation.  The glenohumeral joint and acromioclavicular joint are well aligned.  Coracoclavicular distance is normal.  Minimal inferior glenoid rim spurring.  Multiple soft tissue calcifications surrounding the shoulder girdle, possibly intra-articular, adrenal  to 1.7 cm. Alignment is satisfactory. No     fractures, dislocations, or erosive arthritic change.  Negative for radiopaque foreign bodies or air in the soft tissues.  Visualized portions of the chest are clear.  Mildly tortuous aorta.     1.  Negative for acute process involving the visualized osseous structures.  Minor spurring of the inferior glenoid rim. 2.  Multiple calcifications surrounding shoulder, concerning for intra-articular loose bodies.  Clinical correlation is advised. 3.  Incidental findings as noted above. Finalized on: 5/14/2024 10:39 AM By:  Perico Chau MD [facility]# [id number]      2024-05-14 10:41:58.724    [facility]     Assessment:     1. Acute pain of left shoulder    2. Decreased range of motion of left shoulder        Plan:       Acute pain of left shoulder  -     X-Ray Shoulder 2 or More Views Left; Future; Expected date: 05/14/2024  -     Ambulatory referral/consult to Physical/Occupational Therapy  -     ketorolac injection 30 mg    Decreased range of motion of left shoulder        Patient presents with acute left shoulder pain and decreased range of motion. Decision to perform shoulder xray, findings discussed. Decision to administer Ketoralac injection for pain and inflammation. Patient desires to follow up with PT, referral sent. Plan is to manage symptoms, prevent worsening, and follow up as needed/with worsening. Discussed with patient who verbalizes understanding.         Patient Instructions   Hot showers  Heating pad  Gentle stretching  You received a Ketoralac injection in clinic today-AVOID additional NSAIDs x 24 hours  Starting tomorrow you may resume Mobic as directed for pain  Typical course and duration of illness discussed  Signs and symptoms of worsening discussed  Follow up as needed/with worsening  Schedule with Physical Therapy

## 2024-05-14 NOTE — LETTER
May 14, 2024      [facility]  [address], [address]  [facility] [address]  Phone: [phone]  Fax: [phone]       Patient: Yessenia Glover   YOB: 1965  Date of Visit: 05/14/2024    To Whom It May Concern:    Octavio Glover  was at [facility] on 05/14/2024. The patient may return to work/school on 04/16/2024 with no restrictions. If you have any questions or concerns, or if I can be of further assistance, please do not hesitate to contact me.    Sincerely,      Silvana Serra NP

## 2024-05-15 ENCOUNTER — CLINICAL SUPPORT (OUTPATIENT)
Dept: REHABILITATION | Facility: HOSPITAL | Age: 59
End: 2024-05-15
Payer: COMMERCIAL

## 2024-05-15 DIAGNOSIS — M25.511 CHRONIC RIGHT SHOULDER PAIN: ICD-10-CM

## 2024-05-15 DIAGNOSIS — G89.29 CHRONIC RIGHT SHOULDER PAIN: ICD-10-CM

## 2024-05-15 DIAGNOSIS — M25.612 DECREASED ROM OF LEFT SHOULDER: Primary | ICD-10-CM

## 2024-05-15 DIAGNOSIS — R29.898 WEAKNESS OF LEFT SHOULDER: ICD-10-CM

## 2024-05-15 DIAGNOSIS — M25.512 ACUTE PAIN OF LEFT SHOULDER: ICD-10-CM

## 2024-05-15 PROCEDURE — 97112 NEUROMUSCULAR REEDUCATION: CPT | Mod: PN

## 2024-05-15 PROCEDURE — 97161 PT EVAL LOW COMPLEX 20 MIN: CPT | Mod: PN

## 2024-05-15 RX ORDER — MELOXICAM 7.5 MG/1
7.5 TABLET ORAL DAILY
Qty: 30 TABLET | Refills: 3 | Status: CANCELLED | OUTPATIENT
Start: 2024-05-15

## 2024-05-15 NOTE — PROGRESS NOTES
[facility]   Physical Therapy Initial Evaluation        Date: 5/15/2024     Name: Yessenia Glover  Clinic Number: [id number]  Therapy Diagnosis:   Encounter Diagnoses   Name Primary?    Decreased ROM of left shoulder Yes    Weakness of left shoulder       Physician: Silvana Serra NP      Physician Orders: PT Eval and Treat  Medical Diagnosis from Referral: M25.512 (ICD-10-CM) - Acute pain of left shoulder   Evaluation Date: 5/15/2024  Authorization Period Expiration: 12/11/2024  Plan of Care Expiration: 07/14/2024    Progress Update: 06/14/2024   Visit # / Visits authorized: 1 / 1   FOTO: 5/15/2024 - Scored: 1 / 3     PRECAUTIONS: Standard Precautions       Time In: 1305  Time Out: 1350  Total Appointment Time (timed & untimed codes): 45 minutes    SUBJECTIVE     Date of onset: 1 week  Chief Complaint: left shoulder pain    History of current condition - Yessenia is a 59 y.o. female who presents to physical therapy reporting started Sunday morning when she woke up.  Monday she could not raise it at all.     Imaging: [x] Xray [] MRI [] CT: Reviewed    Prior Therapy:  [] No  [x] Yes: Left shoulder  Social History: Pt lives with their family [] House [] Apartment/Condo []other  Stairs: [x] No  [] Yes:   Occupation:   Prior Level of Function: Independent with all activities of daily living  Current Level of Function: Limitation of left shoulder flexion and reaching    Pain:  Current 0/10, worst 10/10, best 0 /10   Location: [] Right [x] Left [] Bilateral: Shoulder  Description: Pounding, ache, and lingers a few minutes  Aggravating Factors: Raising the arm and lifting  Easing Factors: activity avoidance, rest, medication    Pts goals: Pt reported goals are to improve pain and function    _______________________________________________________  Medical History:   Past Medical History:   Diagnosis Date    Hyperlipidemia     Hypertension        Surgical History:   Yessenia  B Adalberto  has a past surgical history that includes Tubal ligation; Thyroidectomy, partial; Axillary Surgery (Right); Colonoscopy (N/A, 2019);  section (1997); and  section (1992).    Medications:   Yessenia has a current medication list which includes the following prescription(s): ascorbic acid/multivit-min, atorvastatin, ca-d3-mag-zinc--leti-boron, capsicum 0.075%, chlorhexidine, clonidine, fish oil-omega-3 fatty acids, gabapentin, ipratropium, meloxicam, meloxicam, mometasone 0.1%, adults multivitamin, ondansetron, promethazine-dextromethorphan, triamcinolone acetonide 0.1%, and valacyclovir.    Allergies:   Review of patient's allergies indicates:   Allergen Reactions    Codeine Itching    Sulfa (sulfonamide antibiotics) Nausea And Vomiting          OBJECTIVE     Posture:  Pt presents with postural abnormalities which include:    [x] Forward Head   [] Increased Lumbar Lordosis   [x] Rounded Shoulder   [] Flat Back Posture   [] Increased Thoracic Kyphosis [] Inominate Rotation   [] Increased Trunk Sway  [] Genu Recurvatum   [] Increased Trunk Rotation  [] Pes Planus   [] Other:     Sensation:  Sensation is [x] Intact [] Impaired-- to light touch    Palpation: Increased tone and tenderness noted with palpation to: left shoulder     CERVICAL-   Cervical  Range of Motion Right   (spine) Left    Function & Pain Goal   Cervical Flexion  100  []Functional  []Dysfunctional  []Painful  []Non-Painful   Functional   Nonpainful   Cervical Extension  100  []Functional  []Dysfunctional  []Painful  []Non-Painful Functional   Nonpainful   Cervical Side Bending 100 100 []Functional  []Dysfunctional  []Painful  []Non-Painful Functional   Nonpainful      SHOULDER-   SHOULDER   Range of Motion Right  Active     Passive Left  Active     Passive Goal   Forward Flexion (180º) 170 170 90 120 170º   Abduction (180º) 170 170 80 100 160º   (*) pain and/or dysfunction) pain and/or dysfunction        UE  STRENGTH -   Upper Extremity  Strength Right   Goal   Shoulder Flexion []1  []2  []3  [x]4  []5                []+ []- 5/5 B   Shoulder Abduction []1  []2  []3  [x]4  []5                []+ []- 5/5 B   Shoulder IR []1  []2  []3  [x]4  []5                []+ []- 5/5 B   Shoulder ER []1  []2  []3  [x]4  [x]5                []+ []- 5/5 B   Elbow Flexion  []1  []2  []3  [x]4  [x]5                []+ []- 5/5 B   Elbow Extension []1  []2  []3  [x]4  [x]5                []+ []- 5/5 B     Upper Extremity  Strength Left   Goal   Shoulder Flexion []1  []2  [x]3  []4  []5                []+ [x]- 5/5 B   Shoulder Abduction []1  []2  [x]3  []4  []5                []+ [x]- 5/5 B   Shoulder IR []1  []2  [x]3  []4  []5                []+ []- 5/5 B   Shoulder ER []1  []2  [x]3  []4  []5                []+ []- 5/5 B   Elbow Flexion  []1  []2  []3  [x]4  []5                []+ []- 5/5 B   Elbow Extension []1  []2  []3  [x]4  []5                []+ []- 5/5 B        Additional Objective Tests and Meausres: Special Tests-   SPECIAL TESTS:    TEST Right Left    Goal   Becker Negative Positive Negative B    Empty can Negative Positive Negative B           FUNCTION:     Intake Outcome Measure for FOTO Shoulder Survey    Therapist reviewed FOTO scores for Yessenia Glover on 5/15/2024.   FOTO documents entered into [employer] - see Media section.    Intake Score: 56%         TREATMENT     Total Treatment time separate from Evaluation: (15) minutes    Yessenia received the following interventions:       Therapeutic exercises to develop strength, ROM, flexibility, posture, and core stabilization for --- minutes including: ·    Activity   Details                                       Manual therapy techniques: Joint mobilizations, Manual traction, Myofacial release, and Soft tissue Mobilization were applied to the: --- for --- minutes, including:     Activity   Details                                       Neuromuscular re-education activities to  improve: Balance, Coordination, Kinesthetic, Sense, Proprioception, Posture for 15 minutes. The following activities were included:    Activity   Details    Resisted rows RTB x     Resisted ER RTB x     Supine flexion with cane x                      Therapeutic activities to improve functional performance for ----  minutes, including:   Activity   Details                                            PATIENT EDUCATION AND HOME EXERCISES     Education/Self-Care provided:  (included in treatment) minutes   Patient educated on the impairments noted above and the effects of physical therapy intervention to improve overall condition and Quality of Life  Patient was educated on all the above exercise prior/during/after for proper posture, positioning, and execution for safe performance with home exercise program.     Written Home Exercises Provided: yes. Prefers: [x] Printed [] Electronic  Exercises were reviewed and Yessenia was able to demonstrate them prior to the end of the session.  Yessenia demonstrated good understanding of the education provided. See EMR under Patient Instructions for exercises provided during therapy sessions.      ASSESSMENT     Yessenia is a 59 y.o. female referred to outpatient Physical Therapy with a medical diagnosis of   Encounter Diagnoses   Name Primary?    Decreased ROM of left shoulder Yes    Weakness of left shoulder    .    Physical exam supports left shoulder impairments including: decreased range of motion, decreased muscular strength, decreased endurance, decreased muscular length/flexibility, impaired joint mobility, and impaired functional mobility.     The above impairments will be addressed through manual therapy techniques, therapeutic exercises, functional training, and modalities as necessary. Patient was treated and educated on exercises for home program, progression of therapy, and benefits of therapy to achieve full functional mobility.       Pt prognosis is Good.   Pt will  benefit from skilled outpatient Physical Therapy to address the deficits stated above and in the chart below, provide pt/family education, and to maximize pt's level of independence.     Plan of care discussed with patient: Yes  Pt's spiritual, cultural and educational needs considered and patient is agreeable to the plan of care and goals as stated below:     Anticipated Barriers for therapy: none    Medical Necessity is demonstrated by the following:     History  Co-morbidities and personal factors that may impact the plan of care [x] LOW: no personal factors / co-morbidities  [] MODERATE: 1-2 personal factors / co-morbidities  [] HIGH: 3+ personal factors / co-morbidities    Moderate / High Support Documentation:   Co-morbidities affecting plan of care:   Past Medical History:   Diagnosis Date    Hyperlipidemia     Hypertension        Personal Factors:   no deficits     Examination  Body Structures and Functions, activity limitations and participation restrictions that may impact the plan of care [x] LOW: addressing 1-2 elements  [] MODERATE: 3+ elements  [] HIGH: 4+ elements (please support below)    Moderate / High Support Documentation:   [] Head / Neck  [] Spine  [] Upper Quarter  [] Lower Quarter  [] Range of motion Deficits  [] Gross Symmetry Deficits  [] Strength Deficits  [] Balance Deficits  [] Gait Deficits  [] Unable to participate in daily activities  [] Unable to perform functional tasks  [] Unable to Care for Self or others  [] Community/ Social Life changes due to impairments     Clinical Presentation [x] LOW: stable  [] MODERATE: Evolving  [] HIGH: Unstable     Decision Making/ Complexity Score: low         SHORT TERM GOALS:  4 weeks Progress Date Met   Recent signs and systems trend is improving in order to progress towards Long term goals.  [] Met  [] Not Met  [] Progressing    Patient will be independent with Home Exercise Program  in order to further progress and return to maximal function. []  Met  [] Not Met  [] Progressing    Pain rating at Worst: 5 /10 in order to progress towards increased independence with activity. [] Met  [] Not Met  [] Progressing    Patient will be able to correct postural deviations in sitting and standing, to decrease pain and promote postural awareness for injury prevention.  [] Met  [] Not Met  [] Progressing    Patient will improve functional outcome (FOTO) score: by 5% to increase self-worth & perceived functional ability towards long term goals [] Met  [] Not Met  [] Progressing      LONG TERM GOALS: 10 weeks Progress Date Met   Patient will return to normal activites of daily living, recreational, and work related activities with less pain and limitation.  [] Met  [] Not Met  [] Progressing    Patient will improve range of motion  to stated goals in order to return to maximal functional potential.  [] Met  [] Not Met  [] Progressing    Patient will improve Strength to stated goals of appropriate musculature in order to improve functional independence.  [] Met  [] Not Met  [] Progressing    Pain Rating at Best: 1/10 to improve Quality of Life.  [] Met  [] Not Met  [] Progressing    Patient will meet predicted functional outcome (FOTO) score: 56% to increase self-worth & perceived functional ability. [] Met  [] Not Met  [] Progressing    Patient will have met/partially met personal goal of: improve pain and function left shoulder [] Met  [] Not Met  [] Progressing          PLAN   Plan of care Certification: 5/15/2024 to 07/14/2024    Outpatient Physical Therapy 2 times weekly for 10 weeks to include any combination of the following interventions: virtual visits, dry needling, modalities, electrical stimulation (IFC, Pre-Mod, Attended with Functional Dry Needling), Manual Therapy, Moist Heat/ Ice, Neuromuscular Re-ed, Patient Education, Self Care, Therapeutic Exercise, Functional Training, and Therapeutic Activites     Thank you for this referral.    Paolo Serrano, PT

## 2024-05-16 NOTE — TELEPHONE ENCOUNTER
No care due was identified.  [facility] Embedded Care Due Messages. Reference number: [id number].   5/15/2024 11:19:25 PM CDT

## 2024-05-17 ENCOUNTER — TELEPHONE (OUTPATIENT)
Dept: URGENT CARE | Facility: CLINIC | Age: 59
End: 2024-05-17
Payer: COMMERCIAL

## 2024-05-17 DIAGNOSIS — M25.512 ACUTE PAIN OF LEFT SHOULDER: Primary | ICD-10-CM

## 2024-05-17 RX ORDER — MELOXICAM 7.5 MG/1
7.5 TABLET ORAL DAILY
Qty: 30 TABLET | Refills: 0 | Status: SHIPPED | OUTPATIENT
Start: 2024-05-17 | End: 2024-06-16

## 2024-05-17 NOTE — TELEPHONE ENCOUNTER
----- Message from Liya Bautista sent at 5/17/2024 11:58 AM CDT -----  Contact: [phone] pt  1MEDICALADVICE     Patient is calling for Medical Advice regarding:  Pt states that meloxicam (MOBIC) 7.5 MG tablet was discussed during visit and assumed that she had some left but doesn't. Pt is requesting a prescription sent to pharmacy.    How long has patient had these symptoms:    Pharmacy name and phone#:  [id number] [place] [facility] [address]  [address]  [place] [address]  Phone: [phone] Fax: [phone]       Would like response via [employer]:  call back    Comments:Please call and advise

## 2024-05-17 NOTE — TELEPHONE ENCOUNTER
Called to inform patient that medication  was sent to her preferred pharmacy.   Patient verbally expressed understanding. Call ended pleasantly.

## 2024-05-19 PROBLEM — R29.898 WEAKNESS OF LEFT SHOULDER: Status: ACTIVE | Noted: 2024-05-19

## 2024-05-19 PROBLEM — M25.512 LEFT SHOULDER PAIN: Status: ACTIVE | Noted: 2024-05-19

## 2024-05-19 PROBLEM — M25.612 DECREASED ROM OF LEFT SHOULDER: Status: ACTIVE | Noted: 2024-05-19

## 2024-05-21 ENCOUNTER — CLINICAL SUPPORT (OUTPATIENT)
Dept: REHABILITATION | Facility: HOSPITAL | Age: 59
End: 2024-05-21
Payer: COMMERCIAL

## 2024-05-21 DIAGNOSIS — M25.612 DECREASED ROM OF LEFT SHOULDER: Primary | ICD-10-CM

## 2024-05-21 DIAGNOSIS — R29.898 WEAKNESS OF LEFT SHOULDER: ICD-10-CM

## 2024-05-21 DIAGNOSIS — M25.512 ACUTE PAIN OF LEFT SHOULDER: ICD-10-CM

## 2024-05-21 PROCEDURE — 97112 NEUROMUSCULAR REEDUCATION: CPT | Mod: PN

## 2024-05-21 PROCEDURE — 97110 THERAPEUTIC EXERCISES: CPT | Mod: PN

## 2024-05-21 NOTE — PROGRESS NOTES
[facility]   Physical Therapy Treatment Note      Name: Yessenia Glover  Clinic Number: [id number]    Therapy Diagnosis: No diagnosis found.  Physician: Silvana Serra NP    Visit Date: 5/21/2024    Physician Orders: PT Eval and Treat  Medical Diagnosis from Referral: M25.512 (ICD-10-CM) - Acute pain of left shoulder   Evaluation Date: 5/15/2024  Authorization Period Expiration: 12/11/2024  Plan of Care Expiration: 07/14/2024                          Progress Update: 06/14/2024            Visit # / Visits authorized: 1 / 1          FOTO: 5/15/2024 - Scored: 1 / 3      PRECAUTIONS: Standard Precautions         Time In: 1400  Time Out: 1455  Total Appointment Time (timed & untimed codes): 55 minutes    PTA Visit #: 0/5       Subjective     Patient reports: Left shoulder pain.  She was compliant with home exercise program.  Response to previous treatment: N/A  Functional change: N/A    Pain: 2/10  Location: left shoulder      Objective      Objective Measures updated at progress report unless specified.     Treatment     Yessenia received the treatments listed below:        Therapeutic exercises to develop strength, ROM, flexibility, posture, and core stabilization for 15 minutes including: ·    Activity   Details    UBE x 3 FWD/ 3 BWD    Doorway stretches x 20 seconds x 3    IR stretch with towel x                      Manual therapy techniques: Joint mobilizations, Manual traction, Myofacial release, and Soft tissue Mobilization were applied to the: --- for --- minutes, including:     Activity   Details                                       Neuromuscular re-education activities to improve: Balance, Coordination, Kinesthetic, Sense, Proprioception, Posture for 40 minutes. The following activities were included:    Activity   Details    Rows x 25# 3x10    Standing shoulder extension x 20# 3x10 (cable)    Standing shoulder T x Red tubing 3x10    Shoulder IR x Red tubing 3x10    Shoulder  ER x Red tubing 3x10    Open books x 2x10    Supine swims x 3x10                     Therapeutic activities to improve functional performance for ---  minutes, including:   Activity   Details                                             Patient Education and Home Exercises       Education provided:   - Home program    Written Home Exercises Provided: Patient instructed to cont prior HEP. Exercises were reviewed and Yessenia was able to demonstrate them prior to the end of the session.  Yessenia demonstrated good  understanding of the education provided. See Electronic Medical Record under Patient Instructions for exercises provided during therapy sessions    Assessment     The patient was instructed in and performed upper extremity strengthening and stretching    Yessenia Is progressing well towards her goals.   Patient prognosis is Good.     Patient will continue to benefit from skilled outpatient physical therapy to address the deficits listed in the problem list box on initial evaluation, provide pt/family education and to maximize pt's level of independence in the home and community environment.     Patient's spiritual, cultural and educational needs considered and pt agreeable to plan of care and goals.     Anticipated barriers to physical therapy: None    SHORT TERM GOALS:  4 weeks Progress Date Met   Recent signs and systems trend is improving in order to progress towards Long term goals.  [] Met  [] Not Met  [] Progressing     Patient will be independent with Home Exercise Program  in order to further progress and return to maximal function. [] Met  [] Not Met  [] Progressing     Pain rating at Worst: 5 /10 in order to progress towards increased independence with activity. [] Met  [] Not Met  [] Progressing     Patient will be able to correct postural deviations in sitting and standing, to decrease pain and promote postural awareness for injury prevention.  [] Met  [] Not Met  [] Progressing     Patient will  improve functional outcome (FOTO) score: by 5% to increase self-worth & perceived functional ability towards long term goals [] Met  [] Not Met  [] Progressing        LONG TERM GOALS: 10 weeks Progress Date Met   Patient will return to normal activites of daily living, recreational, and work related activities with less pain and limitation.  [] Met  [] Not Met  [] Progressing     Patient will improve range of motion  to stated goals in order to return to maximal functional potential.  [] Met  [] Not Met  [] Progressing     Patient will improve Strength to stated goals of appropriate musculature in order to improve functional independence.  [] Met  [] Not Met  [] Progressing     Pain Rating at Best: 1/10 to improve Quality of Life.  [] Met  [] Not Met  [] Progressing     Patient will meet predicted functional outcome (FOTO) score: 56% to increase self-worth & perceived functional ability. [] Met  [] Not Met  [] Progressing     Patient will have met/partially met personal goal of: improve pain and function left shoulder [] Met  [] Not Met  [] Progressing              PLAN   Plan of care Certification: 5/15/2024 to 07/14/2024     Outpatient Physical Therapy 2 times weekly for 10 weeks to include any combination of the following interventions: virtual visits, dry needling, modalities, electrical stimulation (IFC, Pre-Mod, Attended with Functional Dry Needling), Manual Therapy, Moist Heat/ Ice, Neuromuscular Re-ed, Patient Education, Self Care, Therapeutic Exercise, Functional Training, and Therapeutic Activites        Paolo Serrano, PT

## 2024-05-24 ENCOUNTER — CLINICAL SUPPORT (OUTPATIENT)
Dept: REHABILITATION | Facility: HOSPITAL | Age: 59
End: 2024-05-24
Payer: COMMERCIAL

## 2024-05-24 DIAGNOSIS — M25.612 DECREASED ROM OF LEFT SHOULDER: Primary | ICD-10-CM

## 2024-05-24 DIAGNOSIS — R29.898 WEAKNESS OF LEFT SHOULDER: ICD-10-CM

## 2024-05-24 DIAGNOSIS — M25.512 ACUTE PAIN OF LEFT SHOULDER: ICD-10-CM

## 2024-05-24 PROCEDURE — 97110 THERAPEUTIC EXERCISES: CPT | Mod: PN

## 2024-05-24 PROCEDURE — 97112 NEUROMUSCULAR REEDUCATION: CPT | Mod: PN

## 2024-05-27 NOTE — PROGRESS NOTES
[facility]   Physical Therapy Treatment Note      Name: Yessenia Glover  Clinic Number: [id number]    Therapy Diagnosis:   Encounter Diagnoses   Name Primary?    Decreased ROM of left shoulder Yes    Weakness of left shoulder     Acute pain of left shoulder      Physician: Silvana Serra NP    Visit Date: 5/24/2024    Physician Orders: PT Eval and Treat  Medical Diagnosis from Referral: M25.512 (ICD-10-CM) - Acute pain of left shoulder   Evaluation Date: 5/15/2024  Authorization Period Expiration: 12/11/2024  Plan of Care Expiration: 07/14/2024                          Progress Update: 06/14/2024            Visit # / Visits authorized: 1 / 1          FOTO: 5/15/2024 - Scored: 1 / 3      PRECAUTIONS: Standard Precautions         Time In: 0830  Time Out: 0925  Total Appointment Time (timed & untimed codes): 55 minutes    PTA Visit #: 0/5       Subjective     Patient reports: Left shoulder pain with recurrance of right shoulder complaints with exercise  She was compliant with home exercise program.  Response to previous treatment: N/A  Functional change: N/A    Pain: 2/10  Location: left shoulder      Objective      Objective Measures updated at progress report unless specified.     Treatment     Yessenia received the treatments listed below:        Therapeutic exercises to develop strength, ROM, flexibility, posture, and core stabilization for 15 minutes including: ·    Activity   Details    UBE x 3 FWD/ 3 BWD    Doorway stretches x 20 seconds x 3    IR stretch with towel x                      Manual therapy techniques: Joint mobilizations, Manual traction, Myofacial release, and Soft tissue Mobilization were applied to the: --- for --- minutes, including:     Activity   Details                                       Neuromuscular re-education activities to improve: Balance, Coordination, Kinesthetic, Sense, Proprioception, Posture for 40 minutes. The following activities were  included:    Activity   Details    Rows x 25# 3x10    Standing shoulder extension x 20# 3x10 (cable)    Standing shoulder T x Red tubing 3x10    Shoulder IR x Red tubing 3x10    Shoulder ER x Red tubing 3x10    Open books x 2x10    Supine swims x 3x10                     Therapeutic activities to improve functional performance for ---  minutes, including:   Activity   Details                                             Patient Education and Home Exercises       Education provided:   - Home program    Written Home Exercises Provided: Patient instructed to cont prior HEP. Exercises were reviewed and Yessenia was able to demonstrate them prior to the end of the session.  Yessenia demonstrated good  understanding of the education provided. See Electronic Medical Record under Patient Instructions for exercises provided during therapy sessions    Assessment     The patient was instructed in and performed upper extremity strengthening and stretching    Yessenia Is progressing well towards her goals.   Patient prognosis is Good.     Patient will continue to benefit from skilled outpatient physical therapy to address the deficits listed in the problem list box on initial evaluation, provide pt/family education and to maximize pt's level of independence in the home and community environment.     Patient's spiritual, cultural and educational needs considered and pt agreeable to plan of care and goals.     Anticipated barriers to physical therapy: None    SHORT TERM GOALS:  4 weeks Progress Date Met   Recent signs and systems trend is improving in order to progress towards Long term goals.  [] Met  [] Not Met  [] Progressing     Patient will be independent with Home Exercise Program  in order to further progress and return to maximal function. [] Met  [] Not Met  [] Progressing     Pain rating at Worst: 5 /10 in order to progress towards increased independence with activity. [] Met  [] Not Met  [] Progressing     Patient will  be able to correct postural deviations in sitting and standing, to decrease pain and promote postural awareness for injury prevention.  [] Met  [] Not Met  [] Progressing     Patient will improve functional outcome (FOTO) score: by 5% to increase self-worth & perceived functional ability towards long term goals [] Met  [] Not Met  [] Progressing        LONG TERM GOALS: 10 weeks Progress Date Met   Patient will return to normal activites of daily living, recreational, and work related activities with less pain and limitation.  [] Met  [] Not Met  [] Progressing     Patient will improve range of motion  to stated goals in order to return to maximal functional potential.  [] Met  [] Not Met  [] Progressing     Patient will improve Strength to stated goals of appropriate musculature in order to improve functional independence.  [] Met  [] Not Met  [] Progressing     Pain Rating at Best: 1/10 to improve Quality of Life.  [] Met  [] Not Met  [] Progressing     Patient will meet predicted functional outcome (FOTO) score: 56% to increase self-worth & perceived functional ability. [] Met  [] Not Met  [] Progressing     Patient will have met/partially met personal goal of: improve pain and function left shoulder [] Met  [] Not Met  [] Progressing              PLAN   Plan of care Certification: 5/15/2024 to 07/14/2024     Outpatient Physical Therapy 2 times weekly for 10 weeks to include any combination of the following interventions: virtual visits, dry needling, modalities, electrical stimulation (IFC, Pre-Mod, Attended with Functional Dry Needling), Manual Therapy, Moist Heat/ Ice, Neuromuscular Re-ed, Patient Education, Self Care, Therapeutic Exercise, Functional Training, and Therapeutic Activites        Paolo Serrano, PT

## 2024-05-31 ENCOUNTER — CLINICAL SUPPORT (OUTPATIENT)
Dept: REHABILITATION | Facility: HOSPITAL | Age: 59
End: 2024-05-31
Payer: COMMERCIAL

## 2024-05-31 DIAGNOSIS — M25.512 ACUTE PAIN OF LEFT SHOULDER: ICD-10-CM

## 2024-05-31 DIAGNOSIS — M25.612 DECREASED ROM OF LEFT SHOULDER: Primary | ICD-10-CM

## 2024-05-31 DIAGNOSIS — R29.898 WEAKNESS OF LEFT SHOULDER: ICD-10-CM

## 2024-05-31 PROCEDURE — 97110 THERAPEUTIC EXERCISES: CPT | Mod: PN

## 2024-05-31 PROCEDURE — 97112 NEUROMUSCULAR REEDUCATION: CPT | Mod: PN

## 2024-05-31 NOTE — PROGRESS NOTES
[facility]   Physical Therapy Treatment Note      Name: Yessenia Glover  Clinic Number: [id number]    Therapy Diagnosis:   Encounter Diagnoses   Name Primary?    Decreased ROM of left shoulder Yes    Weakness of left shoulder     Acute pain of left shoulder        Physician: Silvana Serra NP    Visit Date: 5/31/2024    Physician Orders: PT Eval and Treat  Medical Diagnosis from Referral: M25.512 (ICD-10-CM) - Acute pain of left shoulder   Evaluation Date: 5/15/2024  Authorization Period Expiration: 12/11/2024  Plan of Care Expiration: 07/14/2024                          Progress Update: 06/14/2024            Visit # / Visits authorized: 1 / 1        3  /19  FOTO: 5/15/2024 - Scored: 1 / 3      PRECAUTIONS: Standard Precautions         Time In: 0830  Time Out: 0923  Total Appointment Time (timed & untimed codes): 53 minutes    PTA Visit #: 0/5       Subjective     Patient reports: left shouldere is better but right shoulder is sore  She was compliant with home exercise program.  Response to previous treatment: N/A  Functional change: N/A    Pain: 2/10  Location: left shoulder      Objective      Objective Measures updated at progress report unless specified.     Treatment     Yessenia received the treatments listed below:        Therapeutic exercises to develop strength, ROM, flexibility, posture, and core stabilization for 15 minutes including: ·    Activity   Details    UBE x 3 FWD/ 3 BWD    Doorway stretches x 20 seconds x 3    IR stretch with towel x                      Manual therapy techniques: Joint mobilizations, Manual traction, Myofacial release, and Soft tissue Mobilization were applied to the: --- for --- minutes, including:     Activity   Details                                       Neuromuscular re-education activities to improve: Balance, Coordination, Kinesthetic, Sense, Proprioception, Posture for 40 minutes. The following activities were included:    Activity    Details    Ball on wall shoulder flexion x x20    Rows{ x 25# 3x10    Standing shoulder extension x 30# 3x10 (cable)    Standing shoulder T x Red tubing 3x10    Shoulder IR x Red tubing 3x10    Shoulder ER x Red tubing 3x10    Open books x 2x10    Supine swims x 3x10                     Therapeutic activities to improve functional performance for ---  minutes, including:   Activity   Details                                             Patient Education and Home Exercises       Education provided:   - Home program    Written Home Exercises Provided: Patient instructed to cont prior HEP. Exercises were reviewed and Yessenia was able to demonstrate them prior to the end of the session.  Yessenia demonstrated good  understanding of the education provided. See Electronic Medical Record under Patient Instructions for exercises provided during therapy sessions    Assessment     The patient is performing activity for shoulder AROM and strengthening as well as activity to improve scapular stability    Yessenia Is progressing well towards her goals.   Patient prognosis is Good.     Patient will continue to benefit from skilled outpatient physical therapy to address the deficits listed in the problem list box on initial evaluation, provide pt/family education and to maximize pt's level of independence in the home and community environment.     Patient's spiritual, cultural and educational needs considered and pt agreeable to plan of care and goals.     Anticipated barriers to physical therapy: None    SHORT TERM GOALS:  4 weeks Progress Date Met   Recent signs and systems trend is improving in order to progress towards Long term goals.  [] Met  [] Not Met  [] Progressing     Patient will be independent with Home Exercise Program  in order to further progress and return to maximal function. [] Met  [] Not Met  [] Progressing     Pain rating at Worst: 5 /10 in order to progress towards increased independence with activity. []  Met  [] Not Met  [] Progressing     Patient will be able to correct postural deviations in sitting and standing, to decrease pain and promote postural awareness for injury prevention.  [] Met  [] Not Met  [] Progressing     Patient will improve functional outcome (FOTO) score: by 5% to increase self-worth & perceived functional ability towards long term goals [] Met  [] Not Met  [] Progressing        LONG TERM GOALS: 10 weeks Progress Date Met   Patient will return to normal activites of daily living, recreational, and work related activities with less pain and limitation.  [] Met  [] Not Met  [] Progressing     Patient will improve range of motion  to stated goals in order to return to maximal functional potential.  [] Met  [] Not Met  [] Progressing     Patient will improve Strength to stated goals of appropriate musculature in order to improve functional independence.  [] Met  [] Not Met  [] Progressing     Pain Rating at Best: 1/10 to improve Quality of Life.  [] Met  [] Not Met  [] Progressing     Patient will meet predicted functional outcome (FOTO) score: 56% to increase self-worth & perceived functional ability. [] Met  [] Not Met  [] Progressing     Patient will have met/partially met personal goal of: improve pain and function left shoulder [] Met  [] Not Met  [] Progressing              PLAN   Plan of care Certification: 5/15/2024 to 07/14/2024     Outpatient Physical Therapy 2 times weekly for 10 weeks to include any combination of the following interventions: virtual visits, dry needling, modalities, electrical stimulation (IFC, Pre-Mod, Attended with Functional Dry Needling), Manual Therapy, Moist Heat/ Ice, Neuromuscular Re-ed, Patient Education, Self Care, Therapeutic Exercise, Functional Training, and Therapeutic Activites        Paolo Serrano, PT

## 2024-06-04 ENCOUNTER — CLINICAL SUPPORT (OUTPATIENT)
Dept: REHABILITATION | Facility: HOSPITAL | Age: 59
End: 2024-06-04
Payer: COMMERCIAL

## 2024-06-04 DIAGNOSIS — M25.512 ACUTE PAIN OF LEFT SHOULDER: ICD-10-CM

## 2024-06-04 DIAGNOSIS — R29.898 WEAKNESS OF LEFT SHOULDER: ICD-10-CM

## 2024-06-04 DIAGNOSIS — M25.612 DECREASED ROM OF LEFT SHOULDER: Primary | ICD-10-CM

## 2024-06-04 PROCEDURE — 97110 THERAPEUTIC EXERCISES: CPT | Mod: PN

## 2024-06-04 PROCEDURE — 97112 NEUROMUSCULAR REEDUCATION: CPT | Mod: PN

## 2024-06-04 NOTE — PROGRESS NOTES
[facility]   Physical Therapy Treatment Note      Name: Yessenia Glover  Clinic Number: [id number]    Therapy Diagnosis:   No diagnosis found.      Physician: Silvana Serra NP    Visit Date: 6/4/2024    Physician Orders: PT Eval and Treat  Medical Diagnosis from Referral: M25.512 (ICD-10-CM) - Acute pain of left shoulder   Evaluation Date: 5/15/2024  Authorization Period Expiration: 12/11/2024  Plan of Care Expiration: 07/14/2024                          Progress Update: 06/14/2024            Visit # / Visits authorized: 1 / 1        3  /19  FOTO: 5/15/2024 - Scored: 1 / 3      PRECAUTIONS: Standard Precautions         Time In: 0836  Time Out: 0930  Total Appointment Time (timed & untimed codes): 54 minutes    PTA Visit #: 0/5       Subjective     Patient reports: right shoulder is sore  She was compliant with home exercise program.  Response to previous treatment: N/A  Functional change: N/A    Pain: 2/10  Location: left shoulder      Objective      Objective Measures updated at progress report unless specified.     Treatment     Yessenia received the treatments listed below:        Therapeutic exercises to develop strength, ROM, flexibility, posture, and core stabilization for 15 minutes including: ·    Activity   Details    UBE x 3 FWD/ 3 BWD    Doorway stretches x 20 seconds x 3    IR stretch with towel x 3 20 seconds                     Manual therapy techniques: Joint mobilizations, Manual traction, Myofacial release, and Soft tissue Mobilization were applied to the: --- for --- minutes, including:     Activity   Details                                       Neuromuscular re-education activities to improve: Balance, Coordination, Kinesthetic, Sense, Proprioception, Posture for 40 minutes. The following activities were included:    Activity   Details    Ball on wall shoulder flexion x x20    Rows (Matrix) x 25# 3x10    Standing shoulder extension x 30# 3x10 (cable)    Standing  shoulder T x Red tubing 3x10    Shoulder IR x Red tubing 3x10    Shoulder ER x Red tubing 3x10    Open books x 2x10    Supine swims x 3x10    Thread the needle on wall  2x10    Scapular protraction retraction on wall  3x10         Therapeutic activities to improve functional performance for ---  minutes, including:   Activity   Details                                             Patient Education and Home Exercises       Education provided:   - Home program    Written Home Exercises Provided: Patient instructed to cont prior HEP. Exercises were reviewed and Yessenia was able to demonstrate them prior to the end of the session.  Yessenia demonstrated good  understanding of the education provided. See Electronic Medical Record under Patient Instructions for exercises provided during therapy sessions    Assessment     The patient is performing activity for shoulder AROM and strengthening as well as activity to improve scapular stability    Yessenia Is progressing well towards her goals.   Patient prognosis is Good.     Patient will continue to benefit from skilled outpatient physical therapy to address the deficits listed in the problem list box on initial evaluation, provide pt/family education and to maximize pt's level of independence in the home and community environment.     Patient's spiritual, cultural and educational needs considered and pt agreeable to plan of care and goals.     Anticipated barriers to physical therapy: None    SHORT TERM GOALS:  4 weeks Progress Date Met   Recent signs and systems trend is improving in order to progress towards Long term goals.  [] Met  [] Not Met  [] Progressing     Patient will be independent with Home Exercise Program  in order to further progress and return to maximal function. [] Met  [] Not Met  [] Progressing     Pain rating at Worst: 5 /10 in order to progress towards increased independence with activity. [] Met  [] Not Met  [] Progressing     Patient will be able to  correct postural deviations in sitting and standing, to decrease pain and promote postural awareness for injury prevention.  [] Met  [] Not Met  [] Progressing     Patient will improve functional outcome (FOTO) score: by 5% to increase self-worth & perceived functional ability towards long term goals [] Met  [] Not Met  [] Progressing        LONG TERM GOALS: 10 weeks Progress Date Met   Patient will return to normal activites of daily living, recreational, and work related activities with less pain and limitation.  [] Met  [] Not Met  [] Progressing     Patient will improve range of motion  to stated goals in order to return to maximal functional potential.  [] Met  [] Not Met  [] Progressing     Patient will improve Strength to stated goals of appropriate musculature in order to improve functional independence.  [] Met  [] Not Met  [] Progressing     Pain Rating at Best: 1/10 to improve Quality of Life.  [] Met  [] Not Met  [] Progressing     Patient will meet predicted functional outcome (FOTO) score: 56% to increase self-worth & perceived functional ability. [] Met  [] Not Met  [] Progressing     Patient will have met/partially met personal goal of: improve pain and function left shoulder [] Met  [] Not Met  [] Progressing              PLAN   Plan of care Certification: 5/15/2024 to 07/14/2024     Outpatient Physical Therapy 2 times weekly for 10 weeks to include any combination of the following interventions: virtual visits, dry needling, modalities, electrical stimulation (IFC, Pre-Mod, Attended with Functional Dry Needling), Manual Therapy, Moist Heat/ Ice, Neuromuscular Re-ed, Patient Education, Self Care, Therapeutic Exercise, Functional Training, and Therapeutic Activites        Paolo Serrano, PT

## 2024-06-07 ENCOUNTER — CLINICAL SUPPORT (OUTPATIENT)
Dept: REHABILITATION | Facility: HOSPITAL | Age: 59
End: 2024-06-07
Payer: COMMERCIAL

## 2024-06-07 DIAGNOSIS — R29.898 WEAKNESS OF LEFT SHOULDER: ICD-10-CM

## 2024-06-07 DIAGNOSIS — M25.612 DECREASED ROM OF LEFT SHOULDER: Primary | ICD-10-CM

## 2024-06-07 DIAGNOSIS — M25.512 ACUTE PAIN OF LEFT SHOULDER: ICD-10-CM

## 2024-06-07 PROCEDURE — 97112 NEUROMUSCULAR REEDUCATION: CPT | Mod: PN

## 2024-06-07 PROCEDURE — 97110 THERAPEUTIC EXERCISES: CPT | Mod: PN

## 2024-06-07 NOTE — PROGRESS NOTES
[facility]   Physical Therapy Treatment Note      Name: Yessenia Glover  Clinic Number: [id number]    Therapy Diagnosis:   Encounter Diagnoses   Name Primary?    Decreased ROM of left shoulder Yes    Weakness of left shoulder     Acute pain of left shoulder          Physician: Silvana Serra NP    Visit Date: 6/7/2024    Physician Orders: PT Eval and Treat  Medical Diagnosis from Referral: M25.512 (ICD-10-CM) - Acute pain of left shoulder   Evaluation Date: 5/15/2024  Authorization Period Expiration: 12/11/2024  Plan of Care Expiration: 07/14/2024                          Progress Update: 06/14/2024            Visit # / Visits authorized: 1 / 1 5 /19  FOTO: 5/15/2024 - Scored: 1 / 3      PRECAUTIONS: Standard Precautions         Time In: 0837  Time Out: 0930  Total Appointment Time (timed & untimed codes): 53 minutes    PTA Visit #: 0/5       Subjective     Patient reports: left shouldere is better but right shoulder is sore  She was compliant with home exercise program.  Response to previous treatment: N/A  Functional change: N/A    Pain: 2/10  Location: left shoulder      Objective      Objective Measures updated at progress report unless specified.     Treatment     Yessenia received the treatments listed below:        Therapeutic exercises to develop strength, ROM, flexibility, posture, and core stabilization for 12 minutes including: ·    Activity   Details    UBE x 3 FWD/ 3 BWD    Doorway stretches x 20 seconds x 3    IR stretch with towel x 20 seconds x 3                     Manual therapy techniques: Joint mobilizations, Manual traction, Myofacial release, and Soft tissue Mobilization were applied to the: right shoulder for 5 minutes, including:     Activity   Details    Scapular mobilization  x     STM x                            Neuromuscular re-education activities to improve: Balance, Coordination, Kinesthetic, Sense, Proprioception, Posture for 38 minutes. The  following activities were included:    Activity   Details    Ball on wall shoulder flexion x x20    Rows (Matrix) x 25# 3x10    Standing shoulder extension x 30# 3x10 (cable)    Standing shoulder T x Red tubing 3x10    Shoulder IR x Red tubing 3x10    Shoulder ER x Red tubing 3x10    Open books x 2x10    Supine swims x 3x10    Thread the needle on wall  2x10    Scapular protraction retraction on wall  3x10         Therapeutic activities to improve functional performance for ---  minutes, including:   Activity   Details                                             Patient Education and Home Exercises       Education provided:   - Home program    Written Home Exercises Provided: Patient instructed to cont prior HEP. Exercises were reviewed and Yessenia was able to demonstrate them prior to the end of the session.  Yessenia demonstrated good  understanding of the education provided. See Electronic Medical Record under Patient Instructions for exercises provided during therapy sessions    Assessment     The patient was initially seen for shoulder pain on the left. Her pain has decreased but during activities her right shoulder which is an older injury has flared up. The patient is performing postural exercises and strengthening to improve scapular mobility.    Yessenia Is progressing well towards her goals.   Patient prognosis is Good.     Patient will continue to benefit from skilled outpatient physical therapy to address the deficits listed in the problem list box on initial evaluation, provide pt/family education and to maximize pt's level of independence in the home and community environment.     Patient's spiritual, cultural and educational needs considered and pt agreeable to plan of care and goals.     Anticipated barriers to physical therapy: None    SHORT TERM GOALS:  4 weeks Progress Date Met   Recent signs and systems trend is improving in order to progress towards Long term goals.  [x] Met  [] Not Met  []  Progressing     Patient will be independent with Home Exercise Program  in order to further progress and return to maximal function. [x] Met  [] Not Met  [] Progressing     Pain rating at Worst: 5 /10 in order to progress towards increased independence with activity. [] Met  [] Not Met  [x] Progressing     Patient will be able to correct postural deviations in sitting and standing, to decrease pain and promote postural awareness for injury prevention.  [] Met  [] Not Met  [x] Progressing     Patient will improve functional outcome (FOTO) score: by 5% to increase self-worth & perceived functional ability towards long term goals [] Met  [] Not Met  [x] Progressing        LONG TERM GOALS: 10 weeks Progress Date Met   Patient will return to normal activites of daily living, recreational, and work related activities with less pain and limitation.  [] Met  [] Not Met  [x] Progressing     Patient will improve range of motion  to stated goals in order to return to maximal functional potential.  [] Met  [] Not Met  [x] Progressing     Patient will improve Strength to stated goals of appropriate musculature in order to improve functional independence.  [] Met  [] Not Met  [x] Progressing     Pain Rating at Best: 1/10 to improve Quality of Life.  [] Met  [] Not Met  [x] Progressing     Patient will meet predicted functional outcome (FOTO) score: 56% to increase self-worth & perceived functional ability. [] Met  [] Not Met  [x] Progressing     Patient will have met/partially met personal goal of: improve pain and function left shoulder [] Met  [] Not Met  [x] Progressing              PLAN   Plan of care Certification: 5/15/2024 to 07/14/2024     Outpatient Physical Therapy 2 times weekly for 10 weeks to include any combination of the following interventions: virtual visits, dry needling, modalities, electrical stimulation (IFC, Pre-Mod, Attended with Functional Dry Needling), Manual Therapy, Moist Heat/ Ice, Neuromuscular  Re-ed, Patient Education, Self Care, Therapeutic Exercise, Functional Training, and Therapeutic Activites        Paolo Serrano, PT

## 2024-07-12 ENCOUNTER — OFFICE VISIT (OUTPATIENT)
Dept: OBSTETRICS AND GYNECOLOGY | Facility: CLINIC | Age: 59
End: 2024-07-12
Payer: COMMERCIAL

## 2024-07-12 VITALS
SYSTOLIC BLOOD PRESSURE: 112 MMHG | WEIGHT: 171.63 LBS | BODY MASS INDEX: 30.41 KG/M2 | DIASTOLIC BLOOD PRESSURE: 86 MMHG | HEIGHT: 63 IN

## 2024-07-12 DIAGNOSIS — N95.1 MENOPAUSAL SYNDROME (HOT FLASHES): ICD-10-CM

## 2024-07-12 DIAGNOSIS — N89.8 VAGINAL DRYNESS: ICD-10-CM

## 2024-07-12 DIAGNOSIS — Z12.4 SCREENING FOR CERVICAL CANCER: ICD-10-CM

## 2024-07-12 DIAGNOSIS — Z01.419 ENCOUNTER FOR GYNECOLOGICAL EXAMINATION (GENERAL) (ROUTINE) WITHOUT ABNORMAL FINDINGS: Primary | ICD-10-CM

## 2024-07-12 PROCEDURE — 99999 PR PBB SHADOW E&M-EST. PATIENT-LVL III: CPT | Mod: PBBFAC,,, | Performed by: OBSTETRICS & GYNECOLOGY

## 2024-07-12 RX ORDER — CLONIDINE HYDROCHLORIDE 0.1 MG/1
0.1 TABLET ORAL NIGHTLY
Qty: 90 TABLET | Refills: 3 | Status: SHIPPED | OUTPATIENT
Start: 2024-07-12 | End: 2025-07-12

## 2024-07-12 RX ORDER — SERTRALINE HYDROCHLORIDE 25 MG/1
1 TABLET, FILM COATED ORAL DAILY
COMMUNITY
Start: 2024-06-03

## 2024-07-12 RX ORDER — METHOCARBAMOL 500 MG/1
500 TABLET, FILM COATED ORAL EVERY 8 HOURS PRN
COMMUNITY
Start: 2024-02-20

## 2024-07-12 RX ORDER — HYDROXYZINE HYDROCHLORIDE 25 MG/1
25 TABLET, FILM COATED ORAL
COMMUNITY
Start: 2024-05-02

## 2024-07-12 NOTE — PROGRESS NOTES
Subjective:       Patient ID: Yessenia Glover is a 59 y.o. female.    Chief Complaint:  Well Woman      History of Present Illness  HPI  Annual Exam-Postmenopausal  Patient presents for annual exam. The patient has no complaints today. The patient is sexually active. --vaginal dryness; GYN screening history: last pap: approximate date  and was normal and last mammogram: approximate date 2023 and was normal. The patient has never been taking hormone replacement therapy. Patient denies post-menopausal vaginal bleeding. The patient wears seatbelts: yes. The patient participates in regular exercise: yes.--3x/wk--cardio;  Has the patient ever been transfused or tattooed?: no. The patient reports that there is not domestic violence in her life.  Problems sleeping  Denies leak of urine  C/o hot flushes    Colonoscopy 2019 due in   GYN & OB History  Patient's last menstrual period was 2018.   Date of Last Pap: 2022    OB History    Para Term  AB Living   2 2 2     2   SAB IAB Ectopic Multiple Live Births           2      # Outcome Date GA Lbr Kevin/2nd Weight Sex Type Anes PTL Lv   2 Term      CS-LTranv   ELVA   1 Term      CS-LTranv   ELVA       Review of Systems  Review of Systems   Genitourinary:  Positive for dyspareunia, hot flashes and vaginal dryness.   Psychiatric/Behavioral:  Positive for sleep disturbance.    All other systems reviewed and are negative.          Objective:      Physical Exam:   Constitutional: She is oriented to person, place, and time. She appears well-developed and well-nourished.     Eyes: Pupils are equal, round, and reactive to light. Conjunctivae and EOM are normal.      Pulmonary/Chest: Effort normal. Right breast exhibits no mass, no nipple discharge, no skin change and no tenderness. Left breast exhibits no mass, no nipple discharge, no skin change and no tenderness. Breasts are symmetrical.        Abdominal: Soft.     Genitourinary:    Inguinal canal,  urethra, bladder, vagina, uterus, right adnexa, left adnexa and rectum normal.      Pelvic exam was performed with patient supine.   The external female genitalia was normal.     Labial bartholins normal.Cervix is normal. Right adnexum displays no mass and no tenderness. Left adnexum displays no mass and no tenderness. No erythema, vaginal discharge, bleeding, rectocele, cystocele or prolapse of vaginal walls in the vagina. Vagina was moist.   pap smear completedUerus contour normal  Normal urethral meatus.Urethra findings: no urethral massBladder findings: no bladder distention and no bladder tenderness          Musculoskeletal: Normal range of motion and moves all extremeties.       Neurological: She is alert and oriented to person, place, and time.    Skin: Skin is warm.    Psychiatric: She has a normal mood and affect. Her behavior is normal.           Assessment:        1. Encounter for gynecological examination (general) (routine) without abnormal findings    2. Menopausal syndrome (hot flashes)    3. Screening for cervical cancer    4. Vaginal dryness               Plan:      Continue annual well woman exam.  Pap 2022 due in 2025; pt prefers pap taken  Reviewed updated recommendations for pap smears (every 3 years) in low risk patients.   Recommend annual pelvic exams.  Reviewed recommendations for annual CBE.  mammo ordered, continue yearly until age 77  Colonoscopy due 2029  Add vaginal moiturizer and continue lubricant with intercourse  Continue diet, exercise, weight loss  Continue clonidine for hot flushes/problems sleeping

## 2024-11-05 ENCOUNTER — TELEPHONE (OUTPATIENT)
Dept: DERMATOLOGY | Facility: CLINIC | Age: 59
End: 2024-11-05
Payer: COMMERCIAL

## 2024-11-05 ENCOUNTER — OFFICE VISIT (OUTPATIENT)
Dept: DERMATOLOGY | Facility: CLINIC | Age: 59
End: 2024-11-05
Payer: COMMERCIAL

## 2024-11-05 VITALS — WEIGHT: 171.75 LBS | BODY MASS INDEX: 30.43 KG/M2 | HEIGHT: 63 IN

## 2024-11-05 DIAGNOSIS — L30.9 DERMATITIS: Primary | ICD-10-CM

## 2024-11-05 PROCEDURE — 96372 THER/PROPH/DIAG INJ SC/IM: CPT | Mod: S$GLB,,, | Performed by: STUDENT IN AN ORGANIZED HEALTH CARE EDUCATION/TRAINING PROGRAM

## 2024-11-05 PROCEDURE — 99214 OFFICE O/P EST MOD 30 MIN: CPT | Mod: 25,S$GLB,, | Performed by: STUDENT IN AN ORGANIZED HEALTH CARE EDUCATION/TRAINING PROGRAM

## 2024-11-05 PROCEDURE — 99999 PR PBB SHADOW E&M-EST. PATIENT-LVL III: CPT | Mod: PBBFAC,,, | Performed by: STUDENT IN AN ORGANIZED HEALTH CARE EDUCATION/TRAINING PROGRAM

## 2024-11-05 PROCEDURE — 3008F BODY MASS INDEX DOCD: CPT | Mod: CPTII,S$GLB,, | Performed by: STUDENT IN AN ORGANIZED HEALTH CARE EDUCATION/TRAINING PROGRAM

## 2024-11-05 PROCEDURE — 1159F MED LIST DOCD IN RCRD: CPT | Mod: CPTII,S$GLB,, | Performed by: STUDENT IN AN ORGANIZED HEALTH CARE EDUCATION/TRAINING PROGRAM

## 2024-11-05 PROCEDURE — 1160F RVW MEDS BY RX/DR IN RCRD: CPT | Mod: CPTII,S$GLB,, | Performed by: STUDENT IN AN ORGANIZED HEALTH CARE EDUCATION/TRAINING PROGRAM

## 2024-11-05 RX ORDER — TRIAMCINOLONE ACETONIDE 40 MG/ML
40 INJECTION, SUSPENSION INTRA-ARTICULAR; INTRAMUSCULAR
Status: COMPLETED | OUTPATIENT
Start: 2024-11-05 | End: 2024-11-05

## 2024-11-05 RX ORDER — BETAMETHASONE SODIUM PHOSPHATE AND BETAMETHASONE ACETATE 3; 3 MG/ML; MG/ML
6 INJECTION, SUSPENSION INTRA-ARTICULAR; INTRALESIONAL; INTRAMUSCULAR; SOFT TISSUE
Status: COMPLETED | OUTPATIENT
Start: 2024-11-05 | End: 2024-11-05

## 2024-11-05 RX ORDER — CLOBETASOL PROPIONATE 0.5 MG/G
OINTMENT TOPICAL 2 TIMES DAILY
Qty: 60 G | Refills: 1 | Status: SHIPPED | OUTPATIENT
Start: 2024-11-05

## 2024-11-05 RX ADMIN — BETAMETHASONE SODIUM PHOSPHATE AND BETAMETHASONE ACETATE 6 MG: 3; 3 INJECTION, SUSPENSION INTRA-ARTICULAR; INTRALESIONAL; INTRAMUSCULAR; SOFT TISSUE at 03:11

## 2024-11-05 RX ADMIN — TRIAMCINOLONE ACETONIDE 40 MG: 40 INJECTION, SUSPENSION INTRA-ARTICULAR; INTRAMUSCULAR at 03:11

## 2024-11-05 NOTE — TELEPHONE ENCOUNTER
Returned call. Pt informed dr. Rendon is out. Offered appt today with dr. Toth at 230. Pt accepted. Appt scheduled.   ----- Message from Shahab Naqvi sent at 11/5/2024  9:01 AM CST -----  Regarding: same day appt request  PATIENT CALL    Pt called to schedule same day EP appt. CC: itching and all over body rash. States that the itching gets worse at night, and confirmed w/ her PCP that this is not a recurrence of shingles. Please call back at [phone] to discuss further and advise

## 2024-11-05 NOTE — PROGRESS NOTES
Two patient identifiers used. Patient was administered celestone 6mg IM in the left ventrogluteal site and triamcinolone acetonide 40mg IM to the right ventrogluteal site per order by Dr. Yobani Toth. Pt tolerated well. Pt experienced no adverse affects 15 minutes post administration.

## 2024-11-06 NOTE — PROGRESS NOTES
Subjective:       Patient ID:  Yessenia Glover is a 59 y.o. female who presents for   Chief Complaint   Patient presents with    Itching    Rash    Spot     History of Present Illness: The patient presents for follow up of a persistent rash, last seen by Dr. Rendon on 8/8/23. Patient reports that the rash has continued to progress and continued to have widespread itching despite using several topical steroids, capsaicin cream. Reports having red to darkened, scaly and very itchy plaques on the body, including lower legs and trunk areas. Denies any new contacts or exposures; medications have been the same, though she has decreased the amount of zoloft taken recently.       Itching    Rash    Spot        Review of Systems   Constitutional:  Negative for fever and chills.   Skin:  Positive for itching, rash and dry skin.        Objective:    Physical Exam   Constitutional: She appears well-developed and well-nourished. No distress.   Neurological: She is alert and oriented to person, place, and time. She is not disoriented.   Psychiatric: She has a normal mood and affect.   Skin:   Areas Examined (abnormalities noted in diagram):   Head / Face Inspection Performed  Neck Inspection Performed  Chest / Axilla Inspection Performed  Abdomen Inspection Performed  Back Inspection Performed  RUE Inspected  LUE Inspection Performed  RLE Inspected  LLE Inspection Performed              Diagram Legend     Erythematous scaling macule/papule c/w actinic keratosis       Vascular papule c/w angioma      Pigmented verrucoid papule/plaque c/w seborrheic keratosis      Yellow umbilicated papule c/w sebaceous hyperplasia      Irregularly shaped tan macule c/w lentigo     1-2 mm smooth white papules consistent with Milia      Movable subcutaneous cyst with punctum c/w epidermal inclusion cyst      Subcutaneous movable cyst c/w pilar cyst      Firm pink to brown papule c/w dermatofibroma      Pedunculated fleshy papule(s) c/w skin tag(s)       Evenly pigmented macule c/w junctional nevus     Mildly variegated pigmented, slightly irregular-bordered macule c/w mildly atypical nevus      Flesh colored to evenly pigmented papule c/w intradermal nevus       Pink pearly papule/plaque c/w basal cell carcinoma      Erythematous hyperkeratotic cursted plaque c/w SCC      Surgical scar with no sign of skin cancer recurrence      Open and closed comedones      Inflammatory papules and pustules      Verrucoid papule consistent consistent with wart     Erythematous eczematous patches and plaques     Dystrophic onycholytic nail with subungual debris c/w onychomycosis     Umbilicated papule    Erythematous-base heme-crusted tan verrucoid plaque consistent with inflamed seborrheic keratosis     Erythematous Silvery Scaling Plaque c/w Psoriasis     See annotation      Assessment / Plan:        Dermatitis - eczematous dermatitis over the body. Will give course of IM steroids along with increase strength of topical steroids. If no improvement at follow-up, consider biopsy.   -     clobetasol 0.05% (TEMOVATE) 0.05 % Oint; Apply topically 2 (two) times daily.  Dispense: 60 g; Refill: 1  -     betamethasone acetate-betamethasone sodium phosphate injection 6 mg  -     triamcinolone acetonide injection 40 mg  -     Counseled patient on gentle skin care regimen, including need for sensitive soaps/detergents, as well as need for frequent use of sensitize moisturizers.            Follow up in about 8 weeks (around 12/31/2024).

## 2024-12-10 ENCOUNTER — HOSPITAL ENCOUNTER (OUTPATIENT)
Dept: RADIOLOGY | Facility: HOSPITAL | Age: 59
Discharge: HOME | End: 2024-12-10
Attending: INTERNAL MEDICINE
Payer: COMMERCIAL

## 2024-12-10 DIAGNOSIS — Z12.31 OTHER SCREENING MAMMOGRAM: ICD-10-CM

## 2024-12-16 ENCOUNTER — HOSPITAL ENCOUNTER (OUTPATIENT)
Dept: RADIOLOGY | Facility: HOSPITAL | Age: 59
Discharge: HOME | End: 2024-12-16
Attending: INTERNAL MEDICINE
Payer: COMMERCIAL

## 2024-12-16 DIAGNOSIS — Z12.31 OTHER SCREENING MAMMOGRAM: ICD-10-CM

## 2024-12-16 PROCEDURE — 77062 BREAST TOMOSYNTHESIS BI: CPT | Mod: 26,,, | Performed by: RADIOLOGY

## 2024-12-16 PROCEDURE — 77062 BREAST TOMOSYNTHESIS BI: CPT | Mod: TC

## 2024-12-16 PROCEDURE — 77066 DX MAMMO INCL CAD BI: CPT | Mod: 26,,, | Performed by: RADIOLOGY

## 2024-12-17 ENCOUNTER — TELEPHONE (OUTPATIENT)
Dept: DERMATOLOGY | Facility: CLINIC | Age: 59
End: 2024-12-17
Payer: COMMERCIAL

## 2024-12-17 NOTE — TELEPHONE ENCOUNTER
Called and spoke to patient. Pt offered appt in [facility] on 12/23. She declined and reports she will keep appt on 12/24.   ----- Message from Terrence sent at 12/17/2024  9:24 AM CST -----  Contact: [phone]  Type:  Sooner Apoointment Request    Caller is requesting a sooner appointment.  Caller declined first available appointment listed below.  Caller will not accept being placed on the waitlist and is requesting a message be sent to doctor.  Name of Caller:Yessenia   When is the first available appointment?12/24  Symptoms:itching   Would the patient rather a call back or a response via [facility]? Call Back   Best Call Back Number:[phone]   Additional Information: pt stated that she is itching really bad and would like to know if provider has anything sooner.      Thanks KB